# Patient Record
Sex: FEMALE | Race: BLACK OR AFRICAN AMERICAN | NOT HISPANIC OR LATINO | Employment: FULL TIME | URBAN - METROPOLITAN AREA
[De-identification: names, ages, dates, MRNs, and addresses within clinical notes are randomized per-mention and may not be internally consistent; named-entity substitution may affect disease eponyms.]

---

## 2017-10-10 ENCOUNTER — HOSPITAL ENCOUNTER (OUTPATIENT)
Dept: OTHER | Facility: HOSPITAL | Age: 47
Setting detail: SPECIMEN
Discharge: HOME | End: 2017-10-10
Attending: INTERNAL MEDICINE | Admitting: INTERNAL MEDICINE

## 2018-08-14 ENCOUNTER — OFFICE VISIT (OUTPATIENT)
Dept: FAMILY MEDICINE CLINIC | Facility: CLINIC | Age: 48
End: 2018-08-14

## 2018-08-14 VITALS
SYSTOLIC BLOOD PRESSURE: 110 MMHG | OXYGEN SATURATION: 98 % | TEMPERATURE: 98.3 F | BODY MASS INDEX: 31.47 KG/M2 | HEIGHT: 62 IN | WEIGHT: 171 LBS | DIASTOLIC BLOOD PRESSURE: 64 MMHG | HEART RATE: 73 BPM

## 2018-08-14 DIAGNOSIS — R10.13 EPIGASTRIC ABDOMINAL PAIN: ICD-10-CM

## 2018-08-14 DIAGNOSIS — R53.83 OTHER FATIGUE: ICD-10-CM

## 2018-08-14 DIAGNOSIS — D64.9 ANEMIA, UNSPECIFIED TYPE: ICD-10-CM

## 2018-08-14 DIAGNOSIS — R25.2 MUSCLE CRAMPS: ICD-10-CM

## 2018-08-14 DIAGNOSIS — K90.0 CELIAC DISEASE: Primary | ICD-10-CM

## 2018-08-14 DIAGNOSIS — L98.9 SKIN LESION OF FACE: ICD-10-CM

## 2018-08-14 PROBLEM — R00.0 TACHYCARDIA: Status: ACTIVE | Noted: 2018-08-14

## 2018-08-14 PROCEDURE — 99203 OFFICE O/P NEW LOW 30 MIN: CPT | Performed by: FAMILY MEDICINE

## 2018-08-14 RX ORDER — SERTRALINE HYDROCHLORIDE 100 MG/1
TABLET, FILM COATED ORAL
COMMUNITY
Start: 2017-10-23 | End: 2018-11-12 | Stop reason: SDUPTHER

## 2018-08-14 RX ORDER — METOPROLOL SUCCINATE 50 MG/1
TABLET, EXTENDED RELEASE ORAL
COMMUNITY
Start: 2018-02-06 | End: 2019-01-24

## 2018-08-14 RX ORDER — OMEPRAZOLE 20 MG/1
CAPSULE, DELAYED RELEASE ORAL
COMMUNITY
Start: 2018-02-28 | End: 2019-11-18

## 2018-08-14 RX ORDER — DICYCLOMINE HCL 20 MG
TABLET ORAL
COMMUNITY
Start: 2018-08-13 | End: 2019-04-16

## 2018-08-14 RX ORDER — FOLIC ACID 1 MG/1
1000 TABLET ORAL DAILY
Refills: 5 | COMMUNITY
Start: 2018-07-23 | End: 2022-01-01

## 2018-08-14 NOTE — PROGRESS NOTES
Subjective   Juanita Burden is a 47 y.o. female. Presents today for   Chief Complaint   Patient presents with   • Establish Care     Pt here to establish care. She has painful rash on right side of her face that she would like checked out. Also, the muscle in her right thigh has been weak for about one month. Also, her hands have been shaking when trying to lift something. Also, her feet keep cramping up.     46 y/o new patient here with hx of celiac disease.  Having repeat endoscopy this Friday;  Having worsening anemia.  Follows with Dr. Salas;  Hx of tachycardia, reports normal stress, ultimately placed on metoprolol, still follows with Cardiology.  Follows with gynecology and placed on HRT and no periods for years, so not cause of anemia;  Had iron infusion July 26 and August 2.  Follows with Dr. Hawk, hematology.  Reports diarrhea better since gluten free diet.    Rash   This is a new problem. The current episode started more than 1 month ago. The problem has been waxing and waning since onset. Location: right cheek and forehead. The rash is characterized by burning and itchiness. She was exposed to nothing. Associated symptoms include diarrhea and fatigue. Pertinent negatives include no shortness of breath. Past treatments include moisturizer, topical steroids, anti-itch cream and antibiotic cream. The treatment provided no relief.   Lower Extremity Issue   This is a new problem. Episode onset: 1 month. The problem occurs constantly. The problem has been unchanged. Associated symptoms include arthralgias, fatigue, myalgias, a rash and weakness. Nothing (denies injuries) aggravates the symptoms. She has tried nothing for the symptoms. The treatment provided no relief.   Muscle Pain   This is a new problem. The current episode started more than 1 month ago. The problem occurs intermittently. Pain location: feet cramp and curl;  The pain is medium. Associated symptoms include constipation, diarrhea, fatigue, a rash  "and weakness. Pertinent negatives include no shortness of breath. The treatment provided moderate relief. She has been behaving normally. Her past medical history is significant for chronic back pain.     Would like anemia rechecked.    Review of Systems   Constitutional: Positive for fatigue.   Respiratory: Negative for shortness of breath.    Gastrointestinal: Positive for constipation and diarrhea.   Musculoskeletal: Positive for arthralgias and myalgias.   Skin: Positive for rash.   Neurological: Positive for weakness.       Patient Active Problem List   Diagnosis   • History of MRSA infection   • Celiac disease   • Tachycardia   • Anemia       Social History     Social History   • Marital status:      Social History Main Topics   • Smoking status: Never Smoker   • Smokeless tobacco: Never Used   • Alcohol use No      Comment: rare   • Drug use: No     Other Topics Concern   • Not on file       No Known Allergies    No current outpatient prescriptions on file prior to visit.     No current facility-administered medications on file prior to visit.        Objective   Vitals:    08/14/18 0924   BP: 110/64   BP Location: Left arm   Patient Position: Sitting   Cuff Size: Large Adult   Pulse: 73   Temp: 98.3 °F (36.8 °C)   TempSrc: Oral   SpO2: 98%   Weight: 77.6 kg (171 lb)   Height: 157.5 cm (62\")       Physical Exam   Constitutional: She appears well-developed and well-nourished.   HENT:   Head: Normocephalic and atraumatic.   Neck: Neck supple. No JVD present. No thyromegaly present.   Cardiovascular: Normal rate, regular rhythm and normal heart sounds.  Exam reveals no gallop and no friction rub.    No murmur heard.  Pulmonary/Chest: Effort normal and breath sounds normal. No respiratory distress. She has no wheezes. She has no rales.   Abdominal: Soft. Bowel sounds are normal. She exhibits no distension. There is tenderness in the epigastric area. There is no rebound and no guarding.   Musculoskeletal: " She exhibits no edema.   Neurological: She is alert.   Skin: Skin is warm and dry.   Right cheek, red, scaly lesion;  Forehead papule   Psychiatric: She has a normal mood and affect. Her behavior is normal.   Nursing note and vitals reviewed.      Assessment/Plan   Juanita was seen today for establish care.    Diagnoses and all orders for this visit:    Celiac disease  -     Comprehensive Metabolic Panel  -     TSH  -     T4, Free  -     T3, Free  -     Vitamin B12  -     Magnesium  -     Ferritin  -     CBC & Differential  -     Folate  -     CK    Anemia, unspecified type  -     Comprehensive Metabolic Panel  -     TSH  -     T4, Free  -     T3, Free  -     Vitamin B12  -     Magnesium  -     Ferritin  -     CBC & Differential  -     Folate  -     CK    Skin lesion of face  -     Ambulatory Referral to Dermatology  -     hydrocortisone 2.5 % cream; Apply  topically to the appropriate area as directed 2 (Two) Times a Day. Avoid eyes    Other fatigue  -     Comprehensive Metabolic Panel  -     TSH  -     T4, Free  -     T3, Free  -     Vitamin B12  -     Magnesium  -     Ferritin  -     CBC & Differential  -     Folate  -     CK    Muscle cramps  -     Comprehensive Metabolic Panel  -     TSH  -     T4, Free  -     T3, Free  -     Vitamin B12  -     Magnesium  -     Ferritin  -     CBC & Differential  -     Folate  -     CK    Epigastric abdominal pain    Forward to Dr. Salas and Dr. Hawk labs  Will check labs as above  Proceed with endoscopy  Continue gluten free diet as directed  D/w prilosec can reduce absorption of b12 and mag         -Follow up: 6 months and prn

## 2018-08-15 LAB
ALBUMIN SERPL-MCNC: 4.1 G/DL (ref 3.5–5.2)
ALBUMIN/GLOB SERPL: 1.4 G/DL
ALP SERPL-CCNC: 49 U/L (ref 39–117)
ALT SERPL-CCNC: 21 U/L (ref 1–33)
AST SERPL-CCNC: 16 U/L (ref 1–32)
BASOPHILS # BLD AUTO: 0.04 10*3/MM3 (ref 0–0.2)
BASOPHILS NFR BLD AUTO: 0.5 % (ref 0–1.5)
BILIRUB SERPL-MCNC: <0.2 MG/DL (ref 0.1–1.2)
BUN SERPL-MCNC: 16 MG/DL (ref 6–20)
BUN/CREAT SERPL: 19.8 (ref 7–25)
CALCIUM SERPL-MCNC: 9.8 MG/DL (ref 8.6–10.5)
CHLORIDE SERPL-SCNC: 104 MMOL/L (ref 98–107)
CK SERPL-CCNC: 19 U/L (ref 20–180)
CO2 SERPL-SCNC: 24 MMOL/L (ref 22–29)
CREAT SERPL-MCNC: 0.81 MG/DL (ref 0.57–1)
EOSINOPHIL # BLD AUTO: 0.13 10*3/MM3 (ref 0–0.7)
EOSINOPHIL NFR BLD AUTO: 1.6 % (ref 0.3–6.2)
ERYTHROCYTE [DISTWIDTH] IN BLOOD BY AUTOMATED COUNT: 27 % (ref 11.7–13)
FERRITIN SERPL-MCNC: 298.4 NG/ML (ref 13–150)
FOLATE SERPL-MCNC: >20 NG/ML (ref 4.78–24.2)
GLOBULIN SER CALC-MCNC: 3 GM/DL
GLUCOSE SERPL-MCNC: 91 MG/DL (ref 65–99)
HCT VFR BLD AUTO: 36.9 % (ref 35.6–45.5)
HGB BLD-MCNC: 10.8 G/DL (ref 11.9–15.5)
IMM GRANULOCYTES # BLD: 0.02 10*3/MM3 (ref 0–0.03)
IMM GRANULOCYTES NFR BLD: 0.2 % (ref 0–0.5)
LYMPHOCYTES # BLD AUTO: 1.5 10*3/MM3 (ref 0.9–4.8)
LYMPHOCYTES NFR BLD AUTO: 18.6 % (ref 19.6–45.3)
MAGNESIUM SERPL-MCNC: 2.4 MG/DL (ref 1.6–2.6)
MCH RBC QN AUTO: 23.2 PG (ref 26.9–32)
MCHC RBC AUTO-ENTMCNC: 29.3 G/DL (ref 32.4–36.3)
MCV RBC AUTO: 79.2 FL (ref 80.5–98.2)
MONOCYTES # BLD AUTO: 0.71 10*3/MM3 (ref 0.2–1.2)
MONOCYTES NFR BLD AUTO: 8.8 % (ref 5–12)
NEUTROPHILS # BLD AUTO: 5.7 10*3/MM3 (ref 1.9–8.1)
NEUTROPHILS NFR BLD AUTO: 70.5 % (ref 42.7–76)
PLATELET # BLD AUTO: 491 10*3/MM3 (ref 140–500)
POTASSIUM SERPL-SCNC: 5.4 MMOL/L (ref 3.5–5.2)
PROT SERPL-MCNC: 7.1 G/DL (ref 6–8.5)
RBC # BLD AUTO: 4.66 10*6/MM3 (ref 3.9–5.2)
SODIUM SERPL-SCNC: 142 MMOL/L (ref 136–145)
T3FREE SERPL-MCNC: 2.5 PG/ML (ref 2–4.4)
T4 FREE SERPL-MCNC: 0.95 NG/DL (ref 0.93–1.7)
TSH SERPL DL<=0.005 MIU/L-ACNC: 4.07 MIU/ML (ref 0.27–4.2)
VIT B12 SERPL-MCNC: 598 PG/ML (ref 211–946)
WBC # BLD AUTO: 8.08 10*3/MM3 (ref 4.5–10.7)

## 2018-08-15 NOTE — PROGRESS NOTES
Call and mail copy of results to patient.  Forward to Dr. Salas (GI) and Dr. Carine chow (hematology).  Potassium borderline elevated, but likely just hemolyzed  Electrolytes are normal  Thyroid, kidney and liver function normal.  Her thyroid is ok, but in borderline normal range.  I would suggest recheck TSH in 3 months dx fatigue to make sure doesn't change.  Blood counts stable as compared to outside labs;  b12 and folate ok.

## 2018-08-29 DIAGNOSIS — R53.83 FATIGUE, UNSPECIFIED TYPE: Primary | ICD-10-CM

## 2018-11-12 RX ORDER — SERTRALINE HYDROCHLORIDE 100 MG/1
100 TABLET, FILM COATED ORAL
Qty: 90 TABLET | Refills: 0 | Status: SHIPPED | OUTPATIENT
Start: 2018-11-12 | End: 2018-12-03

## 2018-11-15 ENCOUNTER — OFFICE VISIT (OUTPATIENT)
Dept: FAMILY MEDICINE CLINIC | Facility: CLINIC | Age: 48
End: 2018-11-15

## 2018-11-15 VITALS
SYSTOLIC BLOOD PRESSURE: 128 MMHG | OXYGEN SATURATION: 98 % | TEMPERATURE: 98.5 F | DIASTOLIC BLOOD PRESSURE: 70 MMHG | HEART RATE: 74 BPM | HEIGHT: 62 IN | BODY MASS INDEX: 30 KG/M2 | WEIGHT: 163 LBS

## 2018-11-15 DIAGNOSIS — K59.03 DRUG INDUCED CONSTIPATION: ICD-10-CM

## 2018-11-15 DIAGNOSIS — L20.89 FLEXURAL ATOPIC DERMATITIS: ICD-10-CM

## 2018-11-15 DIAGNOSIS — C49.A3 GIST (GASTROINTESTINAL STROMAL TUMOR) OF SMALL BOWEL, MALIGNANT (HCC): ICD-10-CM

## 2018-11-15 DIAGNOSIS — R11.0 NAUSEA: ICD-10-CM

## 2018-11-15 DIAGNOSIS — R73.9 HYPERGLYCEMIA: Primary | ICD-10-CM

## 2018-11-15 DIAGNOSIS — M79.10 MYALGIA: ICD-10-CM

## 2018-11-15 PROBLEM — K56.609 BOWEL OBSTRUCTION (HCC): Status: ACTIVE | Noted: 2018-08-14

## 2018-11-15 PROCEDURE — 99213 OFFICE O/P EST LOW 20 MIN: CPT | Performed by: FAMILY MEDICINE

## 2018-11-15 RX ORDER — IMATINIB MESYLATE 400 MG/1
TABLET, FILM COATED ORAL
COMMUNITY
Start: 2018-11-08 | End: 2021-07-12

## 2018-11-15 RX ORDER — ONDANSETRON 4 MG/1
4 TABLET, ORALLY DISINTEGRATING ORAL EVERY 6 HOURS PRN
Qty: 90 TABLET | Refills: 5 | Status: SHIPPED | OUTPATIENT
Start: 2018-11-15 | End: 2019-04-16

## 2018-11-15 RX ORDER — FLUOROURACIL 50 MG/G
CREAM TOPICAL
Refills: 0 | COMMUNITY
Start: 2018-09-06 | End: 2018-11-15

## 2018-11-15 RX ORDER — OXYCODONE AND ACETAMINOPHEN 10; 325 MG/1; MG/1
TABLET ORAL
COMMUNITY
Start: 2018-11-13 | End: 2019-01-24

## 2018-11-15 RX ORDER — TRIAMCINOLONE ACETONIDE 1 MG/G
CREAM TOPICAL 2 TIMES DAILY PRN
Qty: 60 G | Refills: 2 | Status: SHIPPED | OUTPATIENT
Start: 2018-11-15 | End: 2019-01-24

## 2018-11-15 RX ORDER — LUBIPROSTONE 24 UG/1
24 CAPSULE ORAL 2 TIMES DAILY WITH MEALS
Start: 2018-11-15 | End: 2018-11-27 | Stop reason: SDUPTHER

## 2018-11-15 NOTE — PROGRESS NOTES
Subjective   Juanita Burden is a 47 y.o. female. Presents today for   Chief Complaint   Patient presents with   • Follow-up     pt here for hospital follow up   • Hyperglycemia     pt is concerned about high glucose readings   • Hand Pain     pt's hands have been bothering her since getting a lot of blood draws in the hand. the pain has been radiating from the finger tips and her fingers are peeling. she has become a very hard stick and wonders if something going on with her veins.   • Foot Pain     pt's foot has been hurting on the bottom and it is now on the bottom of her toes.       Rash   This is a new problem. The current episode started 1 to 4 weeks ago. The affected locations include the left hand and right hand. The rash is characterized by peeling, scaling and itchiness. Past treatments include nothing. The treatment provided no relief.   Lower Extremity Issue   This is a recurrent problem. The current episode started 1 to 4 weeks ago. The problem occurs intermittently. The problem has been waxing and waning. Associated symptoms include myalgias and a rash. Pertinent negatives include no numbness. The symptoms are aggravated by walking. She has tried nothing for the symptoms. The treatment provided no relief.     Patient found to have partial SBO 2ndary to GIST stromal tumor;  Does also have hx of celiac disease.  Since on pain medications having more constipation;  No blood in stool.    She noticed blood sugar on labs elevated and thought very high, we discussed RBS >200 represent diabetes, but warrants a1c to screen for dm2.  Review of Systems   Musculoskeletal: Positive for myalgias.   Skin: Positive for rash.   Neurological: Negative for numbness.       Patient Active Problem List   Diagnosis   • History of MRSA infection   • Bowel obstruction (CMS/HCC)   • Tachycardia   • Anemia       Social History     Socioeconomic History   • Marital status:      Spouse name: Not on file   • Number of children:  "Not on file   • Years of education: Not on file   • Highest education level: Not on file   Tobacco Use   • Smoking status: Never Smoker   • Smokeless tobacco: Never Used   Substance and Sexual Activity   • Alcohol use: No     Comment: rare   • Drug use: No       No Known Allergies    Current Outpatient Medications on File Prior to Visit   Medication Sig Dispense Refill   • dicyclomine (BENTYL) 20 MG tablet      • folic acid (FOLVITE) 1 MG tablet Take 1,000 mcg by mouth Daily.  5   • hydrocortisone 2.5 % cream Apply  topically to the appropriate area as directed 2 (Two) Times a Day. Avoid eyes 30 g 0   • imatinib (GLEEVEC) 400 MG chemo tablet      • metoprolol succinate XL (TOPROL-XL) 50 MG 24 hr tablet TAKE 1 TABLET BY MOUTH EVERY NIGHT AT BEDTIME     • norethindrone (AYGESTIN) 5 MG tablet Take 5 mg by mouth Daily.  11   • omeprazole (priLOSEC) 20 MG capsule TAKE ONE CAP BY MOUTH DAILY     • oxyCODONE-acetaminophen (PERCOCET)  MG per tablet      • sertraline (ZOLOFT) 100 MG tablet Take 1 tablet by mouth every night at bedtime. 90 tablet 0   • [DISCONTINUED] fluorouracil (EFUDEX) 5 % cream APPLY TO LESIONS ON FACE DAILY FOR 14 DAYS  0     No current facility-administered medications on file prior to visit.        Objective   Vitals:    11/15/18 1615   BP: 128/70   BP Location: Right arm   Patient Position: Sitting   Cuff Size: Adult   Pulse: 74   Temp: 98.5 °F (36.9 °C)   TempSrc: Oral   SpO2: 98%   Weight: 73.9 kg (163 lb)   Height: 157.5 cm (62.01\")       Physical Exam   Constitutional: She appears well-developed and well-nourished.   HENT:   Head: Normocephalic and atraumatic.   Neck: Neck supple. No JVD present. No thyromegaly present.   Cardiovascular: Normal rate, regular rhythm and normal heart sounds. Exam reveals no gallop and no friction rub.   No murmur heard.  Pulmonary/Chest: Effort normal and breath sounds normal. No respiratory distress. She has no wheezes. She has no rales.   Abdominal: Soft. " Bowel sounds are normal. She exhibits no distension. There is no tenderness. There is no rebound and no guarding.   Musculoskeletal: She exhibits no edema.        Right foot: There is tenderness.        Left foot: There is tenderness.   Neurological: She is alert.   Skin: Skin is warm and dry.   Peeling skin over hands.     Psychiatric: She has a normal mood and affect. Her behavior is normal.   Nursing note and vitals reviewed.      Assessment/Plan   Juanita was seen today for follow-up, hyperglycemia, hand pain and foot pain.    Diagnoses and all orders for this visit:    Hyperglycemia  -     Hemoglobin A1c    Flexural atopic dermatitis  -     triamcinolone (KENALOG) 0.1 % cream; Apply  topically to the appropriate area as directed 2 (Two) Times a Day As Needed for Irritation or Rash (to hands).    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    Myalgia    Drug induced constipation  -     lubiprostone (AMITIZA) 24 MCG capsule; Take 1 capsule by mouth 2 (Two) Times a Day With Meals.        -gave #40 samples of amitiza to use for constipation with percocet.  Try changing padding on shoes more regularly  Topical lotion for hands and will give steroid  Gave amitiza samples to try, call if relieves and will send Rx.       -Follow up: 3 months and prn

## 2018-11-16 LAB — HBA1C MFR BLD: 6 % (ref 4.8–5.6)

## 2018-11-18 NOTE — PROGRESS NOTES
Call results to patient.  a1c is 6% which is in prediabetes range.  We will monitor, for now I would focus on eating what can since she has cancer and about to start chemo.

## 2018-11-27 ENCOUNTER — TELEPHONE (OUTPATIENT)
Dept: FAMILY MEDICINE CLINIC | Facility: CLINIC | Age: 48
End: 2018-11-27

## 2018-11-27 DIAGNOSIS — K59.03 DRUG INDUCED CONSTIPATION: ICD-10-CM

## 2018-11-27 RX ORDER — LUBIPROSTONE 24 UG/1
24 CAPSULE ORAL 2 TIMES DAILY WITH MEALS
Qty: 180 CAPSULE | Refills: 1 | Status: SHIPPED | OUTPATIENT
Start: 2018-11-27 | End: 2019-01-24

## 2018-11-30 ENCOUNTER — TELEPHONE (OUTPATIENT)
Dept: FAMILY MEDICINE CLINIC | Facility: CLINIC | Age: 48
End: 2018-11-30

## 2018-12-03 RX ORDER — LAMOTRIGINE 25 MG/1
TABLET ORAL
Qty: 60 TABLET | Refills: 0 | Status: SHIPPED | OUTPATIENT
Start: 2018-12-03 | End: 2018-12-10 | Stop reason: SINTOL

## 2018-12-03 RX ORDER — SERTRALINE HYDROCHLORIDE 100 MG/1
150 TABLET, FILM COATED ORAL
Qty: 90 TABLET | Refills: 0
Start: 2018-12-03 | End: 2019-03-06 | Stop reason: SDUPTHER

## 2018-12-03 NOTE — TELEPHONE ENCOUNTER
She has cancer and I imagine very hard right now.  INcrease sertraline to 100mg 1.5 tabs daily.  Also, add lamictal 25mg 1 po nightly x 3 nights, then 1 po bid.  This may help with irritability.  She should let me know if any thoughts of self harm or not doing well right away.    RRJ

## 2018-12-10 ENCOUNTER — TELEPHONE (OUTPATIENT)
Dept: FAMILY MEDICINE CLINIC | Facility: CLINIC | Age: 48
End: 2018-12-10

## 2018-12-10 RX ORDER — BUSPIRONE HYDROCHLORIDE 7.5 MG/1
7.5 TABLET ORAL 3 TIMES DAILY
Qty: 60 TABLET | Refills: 1 | Status: SHIPPED | OUTPATIENT
Start: 2018-12-10 | End: 2019-01-23 | Stop reason: SDUPTHER

## 2018-12-31 RX ORDER — LAMOTRIGINE 25 MG/1
TABLET ORAL
Qty: 60 TABLET | Refills: 0 | OUTPATIENT
Start: 2018-12-31

## 2019-01-24 ENCOUNTER — OFFICE VISIT (OUTPATIENT)
Dept: FAMILY MEDICINE CLINIC | Facility: CLINIC | Age: 49
End: 2019-01-24

## 2019-01-24 VITALS
HEART RATE: 85 BPM | SYSTOLIC BLOOD PRESSURE: 118 MMHG | WEIGHT: 164 LBS | HEIGHT: 62 IN | DIASTOLIC BLOOD PRESSURE: 74 MMHG | OXYGEN SATURATION: 97 % | BODY MASS INDEX: 30.18 KG/M2 | TEMPERATURE: 99.3 F

## 2019-01-24 DIAGNOSIS — C49.A3 GIST (GASTROINTESTINAL STROMAL TUMOR) OF SMALL BOWEL, MALIGNANT (HCC): Primary | ICD-10-CM

## 2019-01-24 DIAGNOSIS — Z00.00 HEALTHCARE MAINTENANCE: ICD-10-CM

## 2019-01-24 DIAGNOSIS — Z86.14 HISTORY OF MRSA INFECTION: ICD-10-CM

## 2019-01-24 PROCEDURE — 99396 PREV VISIT EST AGE 40-64: CPT | Performed by: NURSE PRACTITIONER

## 2019-01-24 RX ORDER — BUSPIRONE HYDROCHLORIDE 7.5 MG/1
TABLET ORAL
Qty: 60 TABLET | Refills: 1 | Status: SHIPPED | OUTPATIENT
Start: 2019-01-24 | End: 2019-02-14 | Stop reason: SDUPTHER

## 2019-01-24 NOTE — PROGRESS NOTES
"Subjective   Juanita Burden is a 48 y.o. female who presents for annual  physical.     History of Present Illness    History of GIST, prognosis is good with Gleevec, had recent CT, no results yet.   Also history of MRSA infection, last 3 yrs ago  Anxiety is improving, controlled with sertraline increase and buspirone. Noted increase with diagnosis initially. Reports good control and good support system. Trying to adopt. Long term foster care of child.   The following portions of the patient's history were reviewed and updated as appropriate: allergies, current medications, past family history, past medical history, past social history, past surgical history and problem list.    Review of Systems   Constitutional: Negative for activity change, appetite change, fatigue, unexpected weight gain and unexpected weight loss.   Respiratory: Negative.  Negative for shortness of breath.    Cardiovascular: Negative.  Negative for chest pain, palpitations and leg swelling.   Gastrointestinal: Positive for abdominal pain.   Psychiatric/Behavioral: Positive for stress. Negative for sleep disturbance, suicidal ideas and depressed mood. The patient is nervous/anxious.      /74   Pulse 85   Temp 99.3 °F (37.4 °C) (Oral)   Ht 157.5 cm (62\")   Wt 74.4 kg (164 lb)   SpO2 97%   BMI 30.00 kg/m²     Objective   Physical Exam   Constitutional: She appears well-developed and well-nourished.   Neck: Normal range of motion. Neck supple. No thyromegaly present.   Cardiovascular: Normal rate, regular rhythm and normal heart sounds.   Pulmonary/Chest: Effort normal and breath sounds normal.   Lymphadenopathy:     She has no cervical adenopathy.   Skin: Skin is warm and dry.   Psychiatric: She has a normal mood and affect. Her behavior is normal. Judgment and thought content normal.   Nursing note and vitals reviewed.    Assessment/Plan   Problems Addressed this Visit        Digestive    GIST (gastrointestinal stromal " tumor) of small bowel, malignant (CMS/HCC) - Primary       Other    History of MRSA infection      Other Visit Diagnoses     Healthcare maintenance            No contraindications to foster care. Recommend update flu vaccine, but declines.

## 2019-02-14 ENCOUNTER — OFFICE VISIT (OUTPATIENT)
Dept: FAMILY MEDICINE CLINIC | Facility: CLINIC | Age: 49
End: 2019-02-14

## 2019-02-14 VITALS
DIASTOLIC BLOOD PRESSURE: 60 MMHG | TEMPERATURE: 98.6 F | HEART RATE: 81 BPM | WEIGHT: 167 LBS | SYSTOLIC BLOOD PRESSURE: 102 MMHG | BODY MASS INDEX: 30.54 KG/M2 | OXYGEN SATURATION: 97 %

## 2019-02-14 DIAGNOSIS — F41.9 ANXIETY: ICD-10-CM

## 2019-02-14 DIAGNOSIS — L30.8 OTHER ECZEMA: Primary | ICD-10-CM

## 2019-02-14 PROCEDURE — 99213 OFFICE O/P EST LOW 20 MIN: CPT | Performed by: FAMILY MEDICINE

## 2019-02-14 RX ORDER — TRIAMCINOLONE ACETONIDE 5 MG/G
OINTMENT TOPICAL 2 TIMES DAILY
Qty: 60 G | Refills: 2 | Status: SHIPPED | OUTPATIENT
Start: 2019-02-14 | End: 2019-04-16

## 2019-02-14 RX ORDER — BUSPIRONE HYDROCHLORIDE 7.5 MG/1
7.5 TABLET ORAL 3 TIMES DAILY
Qty: 90 TABLET | Refills: 3 | Status: SHIPPED | OUTPATIENT
Start: 2019-02-14 | End: 2019-08-15 | Stop reason: SDUPTHER

## 2019-02-14 NOTE — PROGRESS NOTES
Subjective   Juanita Burden is a 48 y.o. female. Presents today for   Chief Complaint   Patient presents with   • Follow-up     new meds and also has a rash on her back and very itchy.       Rash   This is a new problem. Episode onset: 2 weeks  Location: back. She was exposed to a new detergent/soap (went from free & clear to regular). (Hx of GIST tumor;  ) Past treatments include moisturizer, anti-itch cream and topical steroids. The treatment provided no relief.   anxiety    Rough middle of day for anxiety, would like add tid to buspar as helping    Review of Systems   Skin: Positive for rash.   Psychiatric/Behavioral: Positive for sleep disturbance. The patient is nervous/anxious.        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, malignant (CMS/HCC)   • Tachycardia   • Anemia       Social History     Socioeconomic History   • Marital status:      Spouse name: Not on file   • Number of children: Not on file   • Years of education: Not on file   • Highest education level: Not on file   Tobacco Use   • Smoking status: Never Smoker   • Smokeless tobacco: Never Used   Substance and Sexual Activity   • Alcohol use: No     Comment: rare   • Drug use: No       No Known Allergies    Current Outpatient Medications on File Prior to Visit   Medication Sig Dispense Refill   • busPIRone (BUSPAR) 7.5 MG tablet TAKE 1 TABLET BY MOUTH THREE TIMES A DAY 60 tablet 1   • dicyclomine (BENTYL) 20 MG tablet      • folic acid (FOLVITE) 1 MG tablet Take 1,000 mcg by mouth Daily.  5   • imatinib (GLEEVEC) 400 MG chemo tablet      • norethindrone (AYGESTIN) 5 MG tablet Take 5 mg by mouth Daily.  11   • omeprazole (priLOSEC) 20 MG capsule TAKE ONE CAP BY MOUTH DAILY     • ondansetron ODT (ZOFRAN-ODT) 4 MG disintegrating tablet Take 1 tablet by mouth Every 6 (Six) Hours As Needed for Nausea or Vomiting. 90 tablet 5   • sertraline (ZOLOFT) 100 MG tablet  Take 1.5 tablets by mouth every night at bedtime. 90 tablet 0     No current facility-administered medications on file prior to visit.        Objective   Vitals:    02/14/19 0902   BP: 102/60   Pulse: 81   Temp: 98.6 °F (37 °C)   SpO2: 97%   Weight: 75.8 kg (167 lb)       Physical Exam   Constitutional: She is oriented to person, place, and time. She appears well-developed and well-nourished.   Neurological: She is alert and oriented to person, place, and time.   Skin: Skin is warm and dry.   Papular rash, excoriated over back.   Psychiatric: She has a normal mood and affect. Her behavior is normal.   Nursing note and vitals reviewed.      Assessment/Plan   uJanita was seen today for follow-up.    Diagnoses and all orders for this visit:    Other eczema  -     triamcinolone (KENALOG) 0.5 % ointment; Apply  topically to the appropriate area as directed 2 (Two) Times a Day. To back    Anxiety  -     busPIRone (BUSPAR) 7.5 MG tablet; Take 1 tablet by mouth 3 (Three) Times a Day.    trial topical steroid  Ok change buspar to tid         -Follow up: 6 months

## 2019-03-06 RX ORDER — SERTRALINE HYDROCHLORIDE 100 MG/1
TABLET, FILM COATED ORAL
Qty: 90 TABLET | Refills: 0 | OUTPATIENT
Start: 2019-03-06

## 2019-03-06 RX ORDER — SERTRALINE HYDROCHLORIDE 100 MG/1
150 TABLET, FILM COATED ORAL
Qty: 135 TABLET | Refills: 1 | Status: SHIPPED | OUTPATIENT
Start: 2019-03-06 | End: 2019-07-26 | Stop reason: SDUPTHER

## 2019-04-16 ENCOUNTER — OFFICE VISIT (OUTPATIENT)
Dept: FAMILY MEDICINE CLINIC | Facility: CLINIC | Age: 49
End: 2019-04-16

## 2019-04-16 VITALS
BODY MASS INDEX: 31.65 KG/M2 | OXYGEN SATURATION: 99 % | SYSTOLIC BLOOD PRESSURE: 118 MMHG | WEIGHT: 172 LBS | HEART RATE: 101 BPM | TEMPERATURE: 98.9 F | DIASTOLIC BLOOD PRESSURE: 62 MMHG | HEIGHT: 62 IN

## 2019-04-16 DIAGNOSIS — J01.00 ACUTE NON-RECURRENT MAXILLARY SINUSITIS: Primary | ICD-10-CM

## 2019-04-16 PROCEDURE — 99213 OFFICE O/P EST LOW 20 MIN: CPT | Performed by: NURSE PRACTITIONER

## 2019-04-16 RX ORDER — METOPROLOL SUCCINATE 50 MG/1
50 TABLET, EXTENDED RELEASE ORAL DAILY
COMMUNITY
End: 2020-07-22

## 2019-04-16 RX ORDER — AMOXICILLIN 500 MG/1
1000 CAPSULE ORAL 2 TIMES DAILY
Qty: 28 CAPSULE | Refills: 0 | Status: SHIPPED | OUTPATIENT
Start: 2019-04-16 | End: 2019-05-24

## 2019-04-16 RX ORDER — MULTIVITAMIN WITH IRON
TABLET ORAL
COMMUNITY
End: 2019-11-18

## 2019-04-16 NOTE — PROGRESS NOTES
"Subjective   Juanita Burden is a 48 y.o. female who presents c/o cough, congestion, SOA.     History of Present Illness   Onset with sinus issues x 2 weeks, gradually worsening with cough and congestion, SOA in the last 2 days. Worse with laying down. Has elevated HOB. Taking sinus medication, flonase and zyrtec, but symptoms are worsening. On last labs WBC was down from the chemo, typically able to fight infections with otc medications.   The following portions of the patient's history were reviewed and updated as appropriate: allergies, current medications, past family history, past medical history, past social history, past surgical history and problem list.    Review of Systems   Constitutional: Negative for chills and fever.   HENT: Positive for congestion, ear pain, postnasal drip, rhinorrhea, sinus pressure, sore throat and voice change.    Respiratory: Positive for cough (productive green brown, thick) and shortness of breath. Negative for wheezing.    Cardiovascular: Negative.    Musculoskeletal: Negative.    Skin: Negative.    Allergic/Immunologic: Positive for environmental allergies.   Neurological: Positive for headache.   Psychiatric/Behavioral: Negative.      /62   Pulse 101   Temp 98.9 °F (37.2 °C) (Oral)   Ht 157.5 cm (62\")   Wt 78 kg (172 lb)   SpO2 99%   BMI 31.46 kg/m²     Objective   Physical Exam   Constitutional: She appears well-developed and well-nourished.   HENT:   Right Ear: Tympanic membrane normal.   Left Ear: Tympanic membrane normal.   Nose: Mucosal edema and rhinorrhea present. Right sinus exhibits maxillary sinus tenderness. Left sinus exhibits maxillary sinus tenderness.   Mouth/Throat: Uvula is midline and mucous membranes are normal. Posterior oropharyngeal erythema present.   Neck: Normal range of motion. Neck supple. No tracheal deviation present. No thyromegaly present.   Cardiovascular: Normal rate, regular rhythm and normal heart sounds.   Pulmonary/Chest: Effort " normal and breath sounds normal. No stridor. No respiratory distress.   Lymphadenopathy:     She has no cervical adenopathy.   Skin: Skin is warm and dry.   Psychiatric: She has a normal mood and affect. Her behavior is normal. Judgment and thought content normal.   Nursing note and vitals reviewed.    Assessment/Plan   Problems Addressed this Visit     None      Visit Diagnoses     Acute non-recurrent maxillary sinusitis    -  Primary    Relevant Medications    amoxicillin (AMOXIL) 500 MG capsule        Sinusitis--start amoxil. Continue symptom controllers and follow up if not settling 1 week, sooner if any worsening.

## 2019-05-24 ENCOUNTER — OFFICE VISIT (OUTPATIENT)
Dept: FAMILY MEDICINE CLINIC | Facility: CLINIC | Age: 49
End: 2019-05-24

## 2019-05-24 VITALS
OXYGEN SATURATION: 99 % | HEART RATE: 87 BPM | BODY MASS INDEX: 33.49 KG/M2 | WEIGHT: 182 LBS | HEIGHT: 62 IN | SYSTOLIC BLOOD PRESSURE: 100 MMHG | DIASTOLIC BLOOD PRESSURE: 60 MMHG

## 2019-05-24 DIAGNOSIS — M67.431 GANGLION CYST OF WRIST, RIGHT: Primary | ICD-10-CM

## 2019-05-24 PROCEDURE — 99213 OFFICE O/P EST LOW 20 MIN: CPT | Performed by: FAMILY MEDICINE

## 2019-05-24 RX ORDER — DOXYCYCLINE 50 MG/1
50 CAPSULE ORAL DAILY
Refills: 1 | COMMUNITY
Start: 2019-05-15 | End: 2020-03-16

## 2019-05-24 NOTE — PROGRESS NOTES
Subjective   Juanita Burden is a 48 y.o. female. Presents today for   Chief Complaint   Patient presents with   • Wrist Pain     right       Wrist Pain    Pain location: right wrist. This is a new problem. Episode onset: 6 to 8 months;  has had prior ganglion cysts removed.  Starting to hurt. The problem occurs constantly. The problem has been unchanged. The pain is moderate. Pertinent negatives include no joint locking, joint swelling, limited range of motion, numbness or tingling. She has tried nothing for the symptoms. The treatment provided no relief.       Review of Systems   Neurological: Negative for tingling and numbness.       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, malignant (CMS/HCC)   • Tachycardia   • Anemia   • Anxiety       Social History     Socioeconomic History   • Marital status:      Spouse name: Not on file   • Number of children: Not on file   • Years of education: Not on file   • Highest education level: Not on file   Tobacco Use   • Smoking status: Never Smoker   • Smokeless tobacco: Never Used   Substance and Sexual Activity   • Alcohol use: No     Comment: rare   • Drug use: No       No Known Allergies    Current Outpatient Medications on File Prior to Visit   Medication Sig Dispense Refill   • busPIRone (BUSPAR) 7.5 MG tablet Take 1 tablet by mouth 3 (Three) Times a Day. 90 tablet 3   • doxycycline (MONODOX) 50 MG capsule Take 50 mg by mouth Daily.  1   • folic acid (FOLVITE) 1 MG tablet Take 1,000 mcg by mouth Daily.  5   • imatinib (GLEEVEC) 400 MG chemo tablet      • Magnesium 250 MG tablet Take  by mouth.     • metoprolol succinate XL (TOPROL-XL) 50 MG 24 hr tablet Take 50 mg by mouth Daily.     • norethindrone (AYGESTIN) 5 MG tablet Take 5 mg by mouth Daily.  11   • omeprazole (priLOSEC) 20 MG capsule TAKE ONE CAP BY MOUTH DAILY     • sertraline (ZOLOFT) 100 MG tablet Take 1.5 tablets by mouth  "every night at bedtime. 135 tablet 1   • [DISCONTINUED] amoxicillin (AMOXIL) 500 MG capsule Take 2 capsules by mouth 2 (Two) Times a Day. 28 capsule 0     No current facility-administered medications on file prior to visit.        Objective   Vitals:    05/24/19 1132   BP: 100/60   BP Location: Left arm   Patient Position: Sitting   Cuff Size: Adult   Pulse: 87   SpO2: 99%   Weight: 82.6 kg (182 lb)   Height: 157.5 cm (62\")       Physical Exam   Constitutional: She is oriented to person, place, and time. She appears well-developed and well-nourished.   Musculoskeletal:        Right wrist: She exhibits tenderness (small cyst volar surface, is over location of radial artery;  right radial pulse +2/4). She exhibits normal range of motion.   Neurological: She is alert and oriented to person, place, and time.   Skin: Skin is warm and dry.   Psychiatric: She has a normal mood and affect. Her behavior is normal.   Nursing note and vitals reviewed.      Assessment/Plan   Juanita was seen today for wrist pain.    Diagnoses and all orders for this visit:    Ganglion cyst of wrist, right  -     Ambulatory Referral to Hand Surgery    -refer to hand specialist, near radial artery;  Reports pain and would like taken care of.         -Follow up: Prn - RTC if worse or no improvement.    "

## 2019-07-26 RX ORDER — SERTRALINE HYDROCHLORIDE 100 MG/1
TABLET, FILM COATED ORAL
Qty: 135 TABLET | Refills: 1 | Status: SHIPPED | OUTPATIENT
Start: 2019-07-26 | End: 2019-11-18

## 2019-08-15 ENCOUNTER — OFFICE VISIT (OUTPATIENT)
Dept: FAMILY MEDICINE CLINIC | Facility: CLINIC | Age: 49
End: 2019-08-15

## 2019-08-15 VITALS
DIASTOLIC BLOOD PRESSURE: 58 MMHG | BODY MASS INDEX: 33.49 KG/M2 | HEIGHT: 62 IN | HEART RATE: 88 BPM | WEIGHT: 182 LBS | OXYGEN SATURATION: 99 % | SYSTOLIC BLOOD PRESSURE: 90 MMHG

## 2019-08-15 DIAGNOSIS — R53.83 OTHER FATIGUE: ICD-10-CM

## 2019-08-15 DIAGNOSIS — Z81.8 FAMILY HISTORY OF BIPOLAR DISORDER: ICD-10-CM

## 2019-08-15 DIAGNOSIS — Z81.8 FAMILY HISTORY OF ATTENTION DEFICIT HYPERACTIVITY DISORDER (ADHD): ICD-10-CM

## 2019-08-15 DIAGNOSIS — R41.840 CONCENTRATION DEFICIT: ICD-10-CM

## 2019-08-15 DIAGNOSIS — K90.0 CELIAC DISEASE: ICD-10-CM

## 2019-08-15 DIAGNOSIS — C49.A3 GIST (GASTROINTESTINAL STROMAL TUMOR) OF SMALL BOWEL, MALIGNANT (HCC): ICD-10-CM

## 2019-08-15 DIAGNOSIS — F41.9 ANXIETY: Primary | ICD-10-CM

## 2019-08-15 DIAGNOSIS — F32.1 CURRENT MODERATE EPISODE OF MAJOR DEPRESSIVE DISORDER WITHOUT PRIOR EPISODE (HCC): ICD-10-CM

## 2019-08-15 PROCEDURE — 99214 OFFICE O/P EST MOD 30 MIN: CPT | Performed by: FAMILY MEDICINE

## 2019-08-15 RX ORDER — BUSPIRONE HYDROCHLORIDE 10 MG/1
10 TABLET ORAL 3 TIMES DAILY
Qty: 90 TABLET | Refills: 5 | Status: SHIPPED | OUTPATIENT
Start: 2019-08-15 | End: 2019-09-12 | Stop reason: SDUPTHER

## 2019-08-15 RX ORDER — DICYCLOMINE HCL 20 MG
1 TABLET ORAL AS NEEDED
COMMUNITY
Start: 2019-07-26 | End: 2019-11-18

## 2019-08-15 NOTE — PROGRESS NOTES
Subjective   Juanita Burden is a 48 y.o. female. Presents today for   Chief Complaint   Patient presents with   • Anxiety   • Depression       Anxiety   Presents for follow-up visit. Symptoms include decreased concentration, depressed mood, excessive worry, insomnia, irritability, malaise, muscle tension and nervous/anxious behavior. Patient reports no chest pain, palpitations, shortness of breath or suicidal ideas. Primary symptoms comment: Feels very fatigued;  Has celiac disease, but very good about diet.  Reports shopping on line due to stress.  Buspar helps but has to have tid;  zoloft helped at 1st.  Mom has bipolar disorder.  Son has ADHD.  Has hard time completing tasks.. Symptoms occur constantly. The severity of symptoms is moderate. The quality of sleep is fair. Nighttime awakenings: occasional.     Compliance with medications is %. Treatment side effects: No side effcts on medicatoins.       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    Review of Systems   Constitutional: Positive for irritability. Negative for chills and fever.   Respiratory: Negative for shortness of breath.    Cardiovascular: Negative for chest pain and palpitations.   Psychiatric/Behavioral: Positive for decreased concentration and sleep disturbance. Negative for self-injury and suicidal ideas. The patient is nervous/anxious and has insomnia.        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5 MG tablet Take 1 tablet by mouth 3 (Three) Times a Day. 90 tablet 3   • dicyclomine (BENTYL) 20 MG tablet Take 1 tablet by mouth As Needed.     • doxycycline (MONODOX) 50 MG capsule Take 50 mg by mouth Daily.  1   • folic acid (FOLVITE) 1 MG tablet Take 1,000 mcg by mouth Daily.  5   • imatinib (GLEEVEC) 400 MG chemo tablet      • Magnesium 250 MG tablet Take  by mouth.     • metoprolol succinate XL (TOPROL-XL) 50 MG 24 hr tablet Take 50 mg by mouth Daily.     • norethindrone (AYGESTIN) 5 MG tablet Take 5 mg by mouth Daily.  11   • omeprazole (priLOSEC) 20 MG capsule TAKE ONE CAP BY MOUTH DAILY     • sertraline (ZOLOFT) 100 MG tablet TAKE ONE AND ONE-HALF TABLETS EVERY NIGHT AT BEDTIME 135 tablet 1     No current facility-administered medications on file prior to visit.        Objective   Vitals:    08/15/19 0958   BP: 90/58   BP Location: Left arm   Patient Position: Sitting   Cuff Size: Adult   Pulse: 88   SpO2: 99%   Weight: 82.6 kg (182 lb)   Height: 157.5 cm (62\")       Physical Exam   Constitutional: She appears well-developed and well-nourished.   HENT:   Head: Normocephalic and atraumatic.   Neck: Neck supple. No JVD present. No thyromegaly present.   Cardiovascular: Normal rate, regular rhythm and normal heart sounds. Exam reveals no gallop and no friction rub.   No murmur heard.  Pulmonary/Chest: Effort normal and breath sounds normal. No respiratory distress. She has no wheezes. She has no rales.   Abdominal: Soft. Bowel sounds are normal. She exhibits no distension. There is no tenderness. There is no rebound and no guarding.   Musculoskeletal: She exhibits no edema.   Neurological: She is alert.   Skin: Skin is warm and dry.   Psychiatric: Her behavior is normal. Her mood " appears anxious.   Nursing note and vitals reviewed.      Assessment/Plan   Juanita was seen today for anxiety and depression.    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, malignant (CMS/HCC)    Celiac disease    Current moderate episode of major depressive disorder without prior episode (CMS/HCC)  -     Ambulatory Referral to Neuropsychology    Concentration deficit  -     Ambulatory Referral to Neuropsychology    Family history of bipolar disorder  -     Ambulatory Referral to Neuropsychology    Family history of attention deficit hyperactivity disorder (ADHD)  -     Ambulatory Referral to Neuropsychology    Other fatigue  -     CBC & Differential  -     Comprehensive Metabolic Panel  -     TSH  -     T3, Free  -     T4, Free  -     Vitamin B12    -patient with anxiety not controlled;  Having mood swings and irritability;  Has concentration and focus issues for mos of life with both Bipolar and ADHD as family history.  I discussed with options and will increase buspar dose, but prior to any other medication changes highly recommend neuropsych exam to ensure treating appropriate diagnosis.  She is in agreement.  I gave contact information to call for an appt.    -fatigue- check full labs as noted above  -GIST -tumor reviewed CT and considerable shrinkage of tumors, which good  -celiac disease - doing well with diet;  continue       -Follow up: 3 months and prn

## 2019-08-16 LAB
ALBUMIN SERPL-MCNC: 4.7 G/DL (ref 3.5–5.2)
ALBUMIN/GLOB SERPL: 2.4 G/DL
ALP SERPL-CCNC: 42 U/L (ref 39–117)
ALT SERPL-CCNC: 15 U/L (ref 1–33)
AST SERPL-CCNC: 17 U/L (ref 1–32)
BASOPHILS # BLD AUTO: 0.03 10*3/MM3 (ref 0–0.2)
BASOPHILS NFR BLD AUTO: 0.5 % (ref 0–1.5)
BILIRUB SERPL-MCNC: 0.5 MG/DL (ref 0.2–1.2)
BUN SERPL-MCNC: 14 MG/DL (ref 6–20)
BUN/CREAT SERPL: 17.5 (ref 7–25)
CALCIUM SERPL-MCNC: 8.9 MG/DL (ref 8.6–10.5)
CHLORIDE SERPL-SCNC: 105 MMOL/L (ref 98–107)
CO2 SERPL-SCNC: 27.7 MMOL/L (ref 22–29)
CREAT SERPL-MCNC: 0.8 MG/DL (ref 0.57–1)
EOSINOPHIL # BLD AUTO: 0.11 10*3/MM3 (ref 0–0.4)
EOSINOPHIL NFR BLD AUTO: 2 % (ref 0.3–6.2)
ERYTHROCYTE [DISTWIDTH] IN BLOOD BY AUTOMATED COUNT: 13.9 % (ref 12.3–15.4)
GLOBULIN SER CALC-MCNC: 2 GM/DL
GLUCOSE SERPL-MCNC: 79 MG/DL (ref 65–99)
HCT VFR BLD AUTO: 39.7 % (ref 34–46.6)
HGB BLD-MCNC: 12.4 G/DL (ref 12–15.9)
IMM GRANULOCYTES # BLD AUTO: 0.01 10*3/MM3 (ref 0–0.05)
IMM GRANULOCYTES NFR BLD AUTO: 0.2 % (ref 0–0.5)
LYMPHOCYTES # BLD AUTO: 1.33 10*3/MM3 (ref 0.7–3.1)
LYMPHOCYTES NFR BLD AUTO: 24.3 % (ref 19.6–45.3)
MCH RBC QN AUTO: 32.3 PG (ref 26.6–33)
MCHC RBC AUTO-ENTMCNC: 31.2 G/DL (ref 31.5–35.7)
MCV RBC AUTO: 103.4 FL (ref 79–97)
MONOCYTES # BLD AUTO: 0.42 10*3/MM3 (ref 0.1–0.9)
MONOCYTES NFR BLD AUTO: 7.7 % (ref 5–12)
NEUTROPHILS # BLD AUTO: 3.57 10*3/MM3 (ref 1.7–7)
NEUTROPHILS NFR BLD AUTO: 65.3 % (ref 42.7–76)
NRBC BLD AUTO-RTO: 0 /100 WBC (ref 0–0.2)
PLATELET # BLD AUTO: 282 10*3/MM3 (ref 140–450)
POTASSIUM SERPL-SCNC: 4.9 MMOL/L (ref 3.5–5.2)
PROT SERPL-MCNC: 6.7 G/DL (ref 6–8.5)
RBC # BLD AUTO: 3.84 10*6/MM3 (ref 3.77–5.28)
SODIUM SERPL-SCNC: 143 MMOL/L (ref 136–145)
T3FREE SERPL-MCNC: 3 PG/ML (ref 2–4.4)
T4 FREE SERPL-MCNC: 1.05 NG/DL (ref 0.93–1.7)
TSH SERPL DL<=0.005 MIU/L-ACNC: 2.55 MIU/ML (ref 0.27–4.2)
VIT B12 SERPL-MCNC: 522 PG/ML (ref 211–946)
WBC # BLD AUTO: 5.47 10*3/MM3 (ref 3.4–10.8)

## 2019-08-19 DIAGNOSIS — F41.9 ANXIETY: ICD-10-CM

## 2019-08-19 RX ORDER — BUSPIRONE HYDROCHLORIDE 7.5 MG/1
TABLET ORAL
Qty: 90 TABLET | Refills: 3 | Status: SHIPPED | OUTPATIENT
Start: 2019-08-19 | End: 2019-11-18

## 2019-09-12 DIAGNOSIS — F41.9 ANXIETY: ICD-10-CM

## 2019-09-12 RX ORDER — BUSPIRONE HYDROCHLORIDE 10 MG/1
10 TABLET ORAL 3 TIMES DAILY
Qty: 270 TABLET | Refills: 1 | Status: SHIPPED | OUTPATIENT
Start: 2019-09-12 | End: 2019-12-16 | Stop reason: SDUPTHER

## 2019-10-17 ENCOUNTER — CLINICAL SUPPORT (OUTPATIENT)
Dept: FAMILY MEDICINE CLINIC | Facility: CLINIC | Age: 49
End: 2019-10-17

## 2019-10-17 DIAGNOSIS — Z23 FLU VACCINE NEED: ICD-10-CM

## 2019-10-17 DIAGNOSIS — Z23 IMMUNIZATION DUE: Primary | ICD-10-CM

## 2019-10-17 PROCEDURE — 90674 CCIIV4 VAC NO PRSV 0.5 ML IM: CPT | Performed by: NURSE PRACTITIONER

## 2019-10-17 PROCEDURE — 90471 IMMUNIZATION ADMIN: CPT | Performed by: NURSE PRACTITIONER

## 2019-10-17 PROCEDURE — 90472 IMMUNIZATION ADMIN EACH ADD: CPT | Performed by: NURSE PRACTITIONER

## 2019-10-17 PROCEDURE — 90732 PPSV23 VACC 2 YRS+ SUBQ/IM: CPT | Performed by: NURSE PRACTITIONER

## 2019-11-18 ENCOUNTER — OFFICE VISIT (OUTPATIENT)
Dept: FAMILY MEDICINE CLINIC | Facility: CLINIC | Age: 49
End: 2019-11-18

## 2019-11-18 VITALS
SYSTOLIC BLOOD PRESSURE: 110 MMHG | DIASTOLIC BLOOD PRESSURE: 70 MMHG | OXYGEN SATURATION: 99 % | HEART RATE: 72 BPM | BODY MASS INDEX: 35.3 KG/M2 | WEIGHT: 193 LBS | TEMPERATURE: 98.6 F

## 2019-11-18 DIAGNOSIS — F41.8 DEPRESSION WITH ANXIETY: Primary | ICD-10-CM

## 2019-11-18 PROCEDURE — 99214 OFFICE O/P EST MOD 30 MIN: CPT | Performed by: FAMILY MEDICINE

## 2019-11-18 RX ORDER — FLUOXETINE HYDROCHLORIDE 20 MG/1
20 CAPSULE ORAL DAILY
Qty: 30 CAPSULE | Refills: 5 | Status: SHIPPED | OUTPATIENT
Start: 2019-11-18 | End: 2019-12-16 | Stop reason: SDUPTHER

## 2019-11-18 RX ORDER — FLUOXETINE 10 MG/1
CAPSULE ORAL
Qty: 14 CAPSULE | Refills: 0 | Status: SHIPPED | OUTPATIENT
Start: 2019-11-18 | End: 2020-01-14 | Stop reason: DRUGHIGH

## 2019-11-18 RX ORDER — PANTOPRAZOLE SODIUM 40 MG/1
40 TABLET, DELAYED RELEASE ORAL DAILY
COMMUNITY
End: 2020-01-14

## 2019-11-18 NOTE — PROGRESS NOTES
Subjective   Juanita Burden is a 48 y.o. female. Presents today for   Chief Complaint   Patient presents with   • Follow-up     had evaluation with jordan   • Depression       Depression   Visit Type: follow-up  Patient presents with the following symptoms: anhedonia, decreased concentration, depressed mood, excessive worry, fatigue, feelings of hopelessness, feelings of worthlessness, insomnia, irritability, muscle tension, nervousness/anxiety and thoughts of death.  Patient is not experiencing: suicidal ideas and suicidal planning.  Frequency of symptoms: constantly   Severity: severe   Sleep quality: fair  Nighttime awakenings: occasional  Compliance with medications:  %  Treatment side effects: no side effects but doesn't feel zoloft and buspar are working.  asked about prozac or trintellix or pristiq.    Had neuropsych exam completed as fam hx of adhd, bipolar;  Does not have features of this but does have depression and FILEMON.  Has stage IV GIST cancer, Mom moved in and patient's son with 5 y/o child now moved in.  They have adopted as well.  She is under tremendous stress.      Review of Systems   Constitutional: Positive for irritability.   Psychiatric/Behavioral: Positive for decreased concentration. Negative for suicidal ideas. The patient is nervous/anxious and has insomnia.        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, malignant (CMS/HCC)   • Tachycardia   • Anemia   • Anxiety   • Celiac disease       Social History     Socioeconomic History   • Marital status:      Spouse name: Not on file   • Number of children: Not on file   • Years of education: Not on file   • Highest education level: Not on file   Tobacco Use   • Smoking status: Never Smoker   • Smokeless tobacco: Never Used   Substance and Sexual Activity   • Alcohol use: No     Comment: rare   • Drug use: No       No Known Allergies    Current  Outpatient Medications on File Prior to Visit   Medication Sig Dispense Refill   • busPIRone (BUSPAR) 10 MG tablet Take 1 tablet by mouth 3 (Three) Times a Day. 270 tablet 1   • doxycycline (MONODOX) 50 MG capsule Take 50 mg by mouth Daily.  1   • folic acid (FOLVITE) 1 MG tablet Take 1,000 mcg by mouth Daily.  5   • imatinib (GLEEVEC) 400 MG chemo tablet      • metoprolol succinate XL (TOPROL-XL) 50 MG 24 hr tablet Take 50 mg by mouth Daily.     • norethindrone (AYGESTIN) 5 MG tablet Take 5 mg by mouth Daily.  11   • pantoprazole (PROTONIX) 40 MG EC tablet Take 40 mg by mouth Daily.     • sertraline (ZOLOFT) 100 MG tablet TAKE ONE AND ONE-HALF TABLETS EVERY NIGHT AT BEDTIME 135 tablet 1   • busPIRone (BUSPAR) 7.5 MG tablet TAKE 1 TABLET BY MOUTH THREE TIMES A DAY 90 tablet 3   • dicyclomine (BENTYL) 20 MG tablet Take 1 tablet by mouth As Needed.     • Magnesium 250 MG tablet Take  by mouth.     • omeprazole (priLOSEC) 20 MG capsule TAKE ONE CAP BY MOUTH DAILY       No current facility-administered medications on file prior to visit.        Objective   Vitals:    11/18/19 1037   BP: 110/70   Pulse: 72   Temp: 98.6 °F (37 °C)   SpO2: 99%   Weight: 87.5 kg (193 lb)       Physical Exam   Constitutional: She appears well-developed and well-nourished.   HENT:   Head: Normocephalic and atraumatic.   Neck: Neck supple. No JVD present. No thyromegaly present.   Cardiovascular: Normal rate, regular rhythm and normal heart sounds. Exam reveals no gallop and no friction rub.   No murmur heard.  Pulmonary/Chest: Effort normal and breath sounds normal. No respiratory distress. She has no wheezes. She has no rales.   Abdominal: Soft. Bowel sounds are normal. She exhibits no distension. There is no tenderness. There is no rebound and no guarding.   Musculoskeletal: She exhibits no edema.   Neurological: She is alert.   Skin: Skin is warm and dry.   Psychiatric: Her behavior is normal. Her mood appears anxious. She exhibits a  depressed mood.   Nursing note and vitals reviewed.      Assessment/Plan   Juanita was seen today for follow-up and depression.    Diagnoses and all orders for this visit:    Depression with anxiety  -     sertraline (ZOLOFT) 50 MG tablet; 1 po daily x 7 days, then 1/2 po daily x 7 days, then 1/2 po every other day x 7 days then stop  -     FLUoxetine (PROZAC) 10 MG capsule; 1 po daily x 14 days, then go to 20mg (start when down to 1/2 of 50mg of zoloft).  -     FLUoxetine (PROZAC) 20 MG capsule; Take 1 capsule by mouth Daily.    I will wean off zoloft and start prozac after starts wean;  Seek care immediately if any thoughts of self harm;  If does well, will take off buspar, but keep for now.         -Follow up: 8 weeks and prn

## 2019-12-02 DIAGNOSIS — F41.8 DEPRESSION WITH ANXIETY: ICD-10-CM

## 2019-12-16 DIAGNOSIS — F41.8 DEPRESSION WITH ANXIETY: ICD-10-CM

## 2019-12-16 DIAGNOSIS — F41.9 ANXIETY: ICD-10-CM

## 2019-12-16 RX ORDER — BUSPIRONE HYDROCHLORIDE 10 MG/1
10 TABLET ORAL 3 TIMES DAILY
Qty: 270 TABLET | Refills: 1 | Status: SHIPPED | OUTPATIENT
Start: 2019-12-16 | End: 2020-01-14

## 2019-12-16 RX ORDER — FLUOXETINE HYDROCHLORIDE 20 MG/1
20 CAPSULE ORAL DAILY
Qty: 90 CAPSULE | Refills: 1 | Status: SHIPPED | OUTPATIENT
Start: 2019-12-16 | End: 2019-12-19 | Stop reason: SDUPTHER

## 2019-12-19 DIAGNOSIS — F41.8 DEPRESSION WITH ANXIETY: ICD-10-CM

## 2019-12-19 RX ORDER — FLUOXETINE HYDROCHLORIDE 20 MG/1
20 CAPSULE ORAL DAILY
Qty: 90 CAPSULE | Refills: 1 | Status: SHIPPED | OUTPATIENT
Start: 2019-12-19 | End: 2020-04-14 | Stop reason: SDUPTHER

## 2020-01-14 ENCOUNTER — OFFICE VISIT (OUTPATIENT)
Dept: FAMILY MEDICINE CLINIC | Facility: CLINIC | Age: 50
End: 2020-01-14

## 2020-01-14 VITALS
SYSTOLIC BLOOD PRESSURE: 100 MMHG | BODY MASS INDEX: 35.7 KG/M2 | WEIGHT: 194 LBS | DIASTOLIC BLOOD PRESSURE: 70 MMHG | HEIGHT: 62 IN | OXYGEN SATURATION: 99 % | HEART RATE: 73 BPM

## 2020-01-14 DIAGNOSIS — F41.8 DEPRESSION WITH ANXIETY: ICD-10-CM

## 2020-01-14 DIAGNOSIS — F41.9 ANXIETY: Primary | ICD-10-CM

## 2020-01-14 PROCEDURE — 99213 OFFICE O/P EST LOW 20 MIN: CPT | Performed by: FAMILY MEDICINE

## 2020-01-14 RX ORDER — BACLOFEN 5 MG/1
1 TABLET ORAL
COMMUNITY
Start: 2019-12-26 | End: 2020-12-14

## 2020-01-14 NOTE — PROGRESS NOTES
Subjective   Juanita Burden is a 49 y.o. female. Presents today for   Chief Complaint   Patient presents with   • Anxiety   • Depression       Anxiety   Presents for follow-up visit. Patient reports no depressed mood, excessive worry or nervous/anxious behavior. Symptoms occur occasionally. The severity of symptoms is mild. The quality of sleep is fair. Nighttime awakenings: occasional.     Compliance with medications is %. Treatment side effects: no side effects and doing better on prozac;  would like wean off buspar and call if needs up dose on prozac.       Patient with GIST tumor, had CT abd/pelvis 11/22/19 noted no change in size.  Will see oncology June 2020 and scan prior to see.  Review of Systems   Psychiatric/Behavioral: The patient is not nervous/anxious.        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, malignant (CMS/HCC)   • Tachycardia   • Anemia   • Anxiety   • Celiac disease       Social History     Socioeconomic History   • Marital status:      Spouse name: Not on file   • Number of children: Not on file   • Years of education: Not on file   • Highest education level: Not on file   Tobacco Use   • Smoking status: Never Smoker   • Smokeless tobacco: Never Used   Substance and Sexual Activity   • Alcohol use: No     Comment: rare   • Drug use: No       No Known Allergies    Current Outpatient Medications on File Prior to Visit   Medication Sig Dispense Refill   • Baclofen 5 MG tablet Take 1 tablet by mouth every night at bedtime.     • busPIRone (BUSPAR) 10 MG tablet Take 1 tablet by mouth 3 (Three) Times a Day. 270 tablet 1   • doxycycline (MONODOX) 50 MG capsule Take 50 mg by mouth Daily.  1   • FLUoxetine (PROZAC) 20 MG capsule Take 1 capsule by mouth Daily. 90 capsule 1   • folic acid (FOLVITE) 1 MG tablet Take 1,000 mcg by mouth Daily.  5   • imatinib (GLEEVEC) 400 MG chemo tablet      • metoprolol  "succinate XL (TOPROL-XL) 50 MG 24 hr tablet Take 50 mg by mouth Daily.     • norethindrone (AYGESTIN) 5 MG tablet Take 5 mg by mouth Daily.  11   • [DISCONTINUED] FLUoxetine (PROZAC) 10 MG capsule 1 po daily x 14 days, then go to 20mg (start when down to 1/2 of 50mg of zoloft). 14 capsule 0   • [DISCONTINUED] pantoprazole (PROTONIX) 40 MG EC tablet Take 40 mg by mouth Daily.     • [DISCONTINUED] sertraline (ZOLOFT) 50 MG tablet 1 po daily x 7 days, then 1/2 po daily x 7 days, then 1/2 po every other day x 7 days then stop 14 tablet 0     No current facility-administered medications on file prior to visit.        Objective   Vitals:    01/14/20 1133   BP: 100/70   BP Location: Left arm   Patient Position: Sitting   Cuff Size: Adult   Pulse: 73   SpO2: 99%   Weight: 88 kg (194 lb)   Height: 157.5 cm (62\")       Physical Exam   Constitutional: She is oriented to person, place, and time. She appears well-developed and well-nourished.   Neurological: She is alert and oriented to person, place, and time.   Skin: Skin is warm and dry.   Psychiatric: She has a normal mood and affect. Her behavior is normal.   Nursing note and vitals reviewed.      Assessment/Plan   Juanita was seen today for anxiety and depression.    Diagnoses and all orders for this visit:    Anxiety        buspar 1/2 po bid x 7 days, then 1/2 po qd x 7 days then stop  If increase in activity will increase prozac.         -Follow up: 3 months and prn  "

## 2020-03-16 ENCOUNTER — OFFICE VISIT (OUTPATIENT)
Dept: FAMILY MEDICINE CLINIC | Facility: CLINIC | Age: 50
End: 2020-03-16

## 2020-03-16 VITALS
SYSTOLIC BLOOD PRESSURE: 118 MMHG | WEIGHT: 201 LBS | BODY MASS INDEX: 36.99 KG/M2 | HEIGHT: 62 IN | DIASTOLIC BLOOD PRESSURE: 74 MMHG | HEART RATE: 75 BPM | OXYGEN SATURATION: 99 % | TEMPERATURE: 98.8 F

## 2020-03-16 DIAGNOSIS — H66.001 NON-RECURRENT ACUTE SUPPURATIVE OTITIS MEDIA OF RIGHT EAR WITHOUT SPONTANEOUS RUPTURE OF TYMPANIC MEMBRANE: ICD-10-CM

## 2020-03-16 DIAGNOSIS — J45.21 MILD INTERMITTENT ASTHMA WITH ACUTE EXACERBATION: Primary | ICD-10-CM

## 2020-03-16 PROCEDURE — 96372 THER/PROPH/DIAG INJ SC/IM: CPT | Performed by: NURSE PRACTITIONER

## 2020-03-16 PROCEDURE — 99213 OFFICE O/P EST LOW 20 MIN: CPT | Performed by: NURSE PRACTITIONER

## 2020-03-16 RX ORDER — BUSPIRONE HYDROCHLORIDE 7.5 MG/1
7.5 TABLET ORAL 3 TIMES DAILY
COMMUNITY
End: 2020-07-22

## 2020-03-16 RX ORDER — METHYLPREDNISOLONE ACETATE 80 MG/ML
80 INJECTION, SUSPENSION INTRA-ARTICULAR; INTRALESIONAL; INTRAMUSCULAR; SOFT TISSUE ONCE
Status: COMPLETED | OUTPATIENT
Start: 2020-03-16 | End: 2020-03-16

## 2020-03-16 RX ORDER — AMOXICILLIN 500 MG/1
1000 CAPSULE ORAL 3 TIMES DAILY
Qty: 40 CAPSULE | Refills: 0 | Status: SHIPPED | OUTPATIENT
Start: 2020-03-16 | End: 2020-05-11

## 2020-03-16 RX ADMIN — METHYLPREDNISOLONE ACETATE 80 MG: 80 INJECTION, SUSPENSION INTRA-ARTICULAR; INTRALESIONAL; INTRAMUSCULAR; SOFT TISSUE at 11:49

## 2020-03-16 NOTE — PATIENT INSTRUCTIONS

## 2020-03-16 NOTE — PROGRESS NOTES
"Subjective   Juanita Burden is a 49 y.o. female who presents c/o sore throat, sinus pressure, cough, congestion x 10 days.     History of Present Illness   Ran a fever 2 days ago only, was 100, has cough as well. Does flonase zyrtec combination this time of year and usually manages. Does not feel SOA, but labored. Cough is mostly dry. Is wheezing at night.  The following portions of the patient's history were reviewed and updated as appropriate: allergies, current medications, past family history, past medical history, past social history, past surgical history and problem list.    Review of Systems   Constitutional: Positive for fatigue and fever. Negative for chills.   HENT: Positive for ear pain, rhinorrhea, sinus pressure and sore throat. Negative for congestion.    Respiratory: Positive for cough. Negative for shortness of breath and wheezing.    Cardiovascular: Negative.    Musculoskeletal: Positive for arthralgias.   Neurological: Positive for headache.   Psychiatric/Behavioral: Negative.      /74   Pulse 75   Temp 98.8 °F (37.1 °C) (Oral)   Ht 157.5 cm (62\")   Wt 91.2 kg (201 lb)   SpO2 99%   BMI 36.76 kg/m²     Objective   Physical Exam   Constitutional: She is oriented to person, place, and time. She appears well-developed and well-nourished. No distress.   HENT:   Right Ear: Tympanic membrane is bulging. A middle ear effusion is present.   Left Ear: A middle ear effusion is present.   Nose: Rhinorrhea present. Right sinus exhibits no maxillary sinus tenderness and no frontal sinus tenderness. Left sinus exhibits no maxillary sinus tenderness and no frontal sinus tenderness.   Mouth/Throat: Uvula is midline, oropharynx is clear and moist and mucous membranes are normal.   Neck: Normal range of motion. Neck supple. No tracheal deviation present. No thyromegaly present.   Cardiovascular: Normal rate, regular rhythm and normal heart sounds.   Pulmonary/Chest: Effort normal and breath sounds normal. " No respiratory distress. She has no wheezes.   Dry cough   Lymphadenopathy:     She has no cervical adenopathy.   Neurological: She is alert and oriented to person, place, and time.   Skin: Skin is warm and dry. Capillary refill takes less than 2 seconds.   Psychiatric: She has a normal mood and affect. Her behavior is normal. Judgment and thought content normal.   Nursing note and vitals reviewed.    Assessment/Plan   Problems Addressed this Visit     None      Visit Diagnoses     Mild intermittent asthma with acute exacerbation    -  Primary    Relevant Medications    methylPREDNISolone acetate (DEPO-medrol) injection 80 mg (Start on 3/16/2020 11:44 AM)    Non-recurrent acute suppurative otitis media of right ear without spontaneous rupture of tympanic membrane        Relevant Medications    amoxicillin (AMOXIL) 500 MG capsule        Does get ear infections/sinus infections and asthma exacerbations. Is immunosuppressed on Gleevac for GIST. Discussed presumption of ear as  of symptom complex. This does not rule out coronavirus, but does not meet criteria for testing. Should self quarantine and start antibiotics. Discussed virus can she longer if we place on steroids, but again, assume her typical seasonal flare with infection. Response to antibiotics likely diagnostic.instructions for quarantine given

## 2020-04-14 ENCOUNTER — TELEMEDICINE (OUTPATIENT)
Dept: FAMILY MEDICINE CLINIC | Facility: CLINIC | Age: 50
End: 2020-04-14

## 2020-04-14 VITALS — RESPIRATION RATE: 12 BRPM

## 2020-04-14 DIAGNOSIS — F41.8 DEPRESSION WITH ANXIETY: ICD-10-CM

## 2020-04-14 DIAGNOSIS — F41.9 ANXIETY: Primary | ICD-10-CM

## 2020-04-14 PROCEDURE — 99213 OFFICE O/P EST LOW 20 MIN: CPT | Performed by: FAMILY MEDICINE

## 2020-04-14 RX ORDER — FLUOXETINE HYDROCHLORIDE 40 MG/1
40 CAPSULE ORAL DAILY
Qty: 30 CAPSULE | Refills: 2 | Status: SHIPPED | OUTPATIENT
Start: 2020-04-14 | End: 2020-05-11 | Stop reason: SDUPTHER

## 2020-04-14 NOTE — PROGRESS NOTES
Subjective   Juanita Burden is a 49 y.o. female. Presents today for   Chief Complaint   Patient presents with   • Anxiety       Anxiety   Presents for follow-up visit. Symptoms include decreased concentration and nervous/anxious behavior. Patient reports no chest pain, depressed mood, excessive worry or suicidal ideas. Symptoms occur occasionally. Current severity: doing ok on prozac and buspar. The quality of sleep is fair.     Compliance with medications is %. Treatment side effects: Was unable wean off buspar as increased anxiety.  Would like come off buspar as tid and be on single agent.   Patient with GIST, on gleevac.  Doing ok;  Was sick in hospital 1 month ago, tested negative for COVID 19 thankfully.  Symptoms all resolved.      Review of Systems   Cardiovascular: Negative for chest pain.   Psychiatric/Behavioral: Positive for decreased concentration. Negative for suicidal ideas. The patient is nervous/anxious.        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, malignant (CMS/HCC)   • Tachycardia   • Anemia   • Anxiety   • Celiac disease       Social History     Socioeconomic History   • Marital status:      Spouse name: Not on file   • Number of children: Not on file   • Years of education: Not on file   • Highest education level: Not on file   Tobacco Use   • Smoking status: Never Smoker   • Smokeless tobacco: Never Used   Substance and Sexual Activity   • Alcohol use: No     Comment: rare   • Drug use: No       No Known Allergies    Current Outpatient Medications on File Prior to Visit   Medication Sig Dispense Refill   • amoxicillin (AMOXIL) 500 MG capsule Take 2 capsules by mouth 3 (Three) Times a Day. 40 capsule 0   • Baclofen 5 MG tablet Take 1 tablet by mouth every night at bedtime.     • busPIRone (BUSPAR) 7.5 MG tablet Take 7.5 mg by mouth 3 (Three) Times a Day.     • FLUoxetine (PROZAC) 20 MG capsule  Take 1 capsule by mouth Daily. 90 capsule 1   • folic acid (FOLVITE) 1 MG tablet Take 1,000 mcg by mouth Daily.  5   • imatinib (GLEEVEC) 400 MG chemo tablet      • metoprolol succinate XL (TOPROL-XL) 50 MG 24 hr tablet Take 50 mg by mouth Daily.     • norethindrone (AYGESTIN) 5 MG tablet Take 5 mg by mouth Daily.  11     No current facility-administered medications on file prior to visit.        Objective   There were no vitals filed for this visit.  There is no height or weight on file to calculate BMI.    Physical Exam   Constitutional: She is oriented to person, place, and time. She appears well-developed and well-nourished. No distress.   HENT:   Head: Normocephalic and atraumatic.   Pulmonary/Chest: Breath sounds normal. No respiratory distress.   Neurological: She is alert and oriented to person, place, and time.   Skin: She is not diaphoretic.   Psychiatric: She has a normal mood and affect. Her speech is normal and behavior is normal. Judgment and thought content normal.   Nursing note and vitals reviewed.      Assessment/Plan   Juanita was seen today for anxiety.    Diagnoses and all orders for this visit:    Anxiety    Depression with anxiety  -     FLUoxetine (PROzac) 40 MG capsule; Take 1 capsule by mouth Daily.    will increase prozac to 40mg po daily.  To call in 2 to 3 weeks on how doing.  IF tolerating and anxiety doing well, will attempt another wean of buspar slowly.         -Follow up: will arrange after pandemic, call in 2 to 3 weeks on how doing.  Seek care if not doing well.

## 2020-05-11 DIAGNOSIS — F41.8 DEPRESSION WITH ANXIETY: ICD-10-CM

## 2020-05-11 PROBLEM — C26.9: Status: ACTIVE | Noted: 2020-05-11

## 2020-05-11 RX ORDER — FLUOXETINE HYDROCHLORIDE 40 MG/1
40 CAPSULE ORAL DAILY
Qty: 90 CAPSULE | Refills: 1 | Status: SHIPPED | OUTPATIENT
Start: 2020-05-11 | End: 2020-12-21

## 2020-06-27 DIAGNOSIS — F41.9 ANXIETY: ICD-10-CM

## 2020-06-29 RX ORDER — BUSPIRONE HYDROCHLORIDE 10 MG/1
TABLET ORAL
Qty: 270 TABLET | Refills: 1 | OUTPATIENT
Start: 2020-06-29

## 2020-07-22 ENCOUNTER — OFFICE VISIT (OUTPATIENT)
Dept: FAMILY MEDICINE CLINIC | Facility: CLINIC | Age: 50
End: 2020-07-22

## 2020-07-22 VITALS
WEIGHT: 198 LBS | SYSTOLIC BLOOD PRESSURE: 104 MMHG | HEART RATE: 97 BPM | OXYGEN SATURATION: 98 % | HEIGHT: 62 IN | DIASTOLIC BLOOD PRESSURE: 68 MMHG | BODY MASS INDEX: 36.44 KG/M2

## 2020-07-22 DIAGNOSIS — I80.01 THROMBOPHLEBITIS OF SUPERFICIAL VEINS OF RIGHT LOWER EXTREMITY: Primary | ICD-10-CM

## 2020-07-22 PROCEDURE — 99213 OFFICE O/P EST LOW 20 MIN: CPT | Performed by: FAMILY MEDICINE

## 2020-07-22 NOTE — PROGRESS NOTES
Subjective   Juantia Burden is a 49 y.o. female. Presents today for   Chief Complaint   Patient presents with   • Leg Pain     right - ER Follow up    • Annual Exam     wellness       Leg Pain    The incident occurred more than 1 week ago. There was no injury mechanism. Pain location: right calf pain and swelling. The quality of the pain is described as aching and cramping. The pain is moderate. The pain has been constant since onset. Pertinent negatives include no numbness or tingling. The symptoms are aggravated by palpation. She has tried NSAIDs for the symptoms. The treatment provided moderate relief.   seen in ER superfical below the knee thrombophlebitis.    Review of Systems   Neurological: Negative for tingling and numbness.       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, malignant (CMS/HCC)   • Tachycardia   • Anemia   • Anxiety   • Celiac disease   • Malignant neoplasm of gastrointestinal tract (CMS/HCC)       Social History     Socioeconomic History   • Marital status:      Spouse name: Not on file   • Number of children: Not on file   • Years of education: Not on file   • Highest education level: Not on file   Tobacco Use   • Smoking status: Never Smoker   • Smokeless tobacco: Never Used   Substance and Sexual Activity   • Alcohol use: No     Comment: rare   • Drug use: No       No Known Allergies    Current Outpatient Medications on File Prior to Visit   Medication Sig Dispense Refill   • Baclofen 5 MG tablet Take 1 tablet by mouth every night at bedtime.     • FLUoxetine (PROzac) 40 MG capsule Take 1 capsule by mouth Daily. 90 capsule 1   • folic acid (FOLVITE) 1 MG tablet Take 1,000 mcg by mouth Daily.  5   • imatinib (GLEEVEC) 400 MG chemo tablet      • norethindrone (AYGESTIN) 5 MG tablet Take 5 mg by mouth Daily.  11   • [DISCONTINUED] busPIRone (BUSPAR) 7.5 MG tablet Take 7.5 mg by mouth 3 (Three) Times a  "Day.     • [DISCONTINUED] metoprolol succinate XL (TOPROL-XL) 50 MG 24 hr tablet Take 50 mg by mouth Daily.       No current facility-administered medications on file prior to visit.        Objective   Vitals:    07/22/20 1054   BP: 104/68   BP Location: Left arm   Patient Position: Sitting   Cuff Size: Adult   Pulse: 97   SpO2: 98%   Weight: 89.8 kg (198 lb)   Height: 157.5 cm (62\")     Body mass index is 36.21 kg/m².    Physical Exam   Constitutional: She is oriented to person, place, and time. She appears well-developed and well-nourished.   Musculoskeletal: She exhibits no edema (right calf mild pain, but swelling improved, pain improved).   Neurological: She is alert and oriented to person, place, and time.   Skin: Skin is warm and dry.   Psychiatric: She has a normal mood and affect. Her behavior is normal.   Nursing note and vitals reviewed.      Assessment/Plan   Juanita was seen today for leg pain.    Diagnoses and all orders for this visit:    Thrombophlebitis of superficial veins of right lower extremity  -     Duplex Venous Lower Extremity - Right CAR; Future    asa bid or ibuprofen as doing  Recommend walk more when drives long distances and compression hose.         -Follow up: Prn - RTC if worse or no improvement.    "

## 2020-08-06 ENCOUNTER — HOSPITAL ENCOUNTER (OUTPATIENT)
Dept: CARDIOLOGY | Facility: HOSPITAL | Age: 50
Discharge: HOME | End: 2020-08-06
Admitting: FAMILY MEDICINE

## 2020-08-06 DIAGNOSIS — I80.01 THROMBOPHLEBITIS OF SUPERFICIAL VEINS OF RIGHT LOWER EXTREMITY: ICD-10-CM

## 2020-08-06 LAB
BH CV LOW VAS RIGHT VARICOSITY BK VESSEL: 1
BH CV LOWER VASCULAR LEFT COMMON FEMORAL AUGMENT: NORMAL
BH CV LOWER VASCULAR LEFT COMMON FEMORAL COMPETENT: NORMAL
BH CV LOWER VASCULAR LEFT COMMON FEMORAL COMPRESS: NORMAL
BH CV LOWER VASCULAR LEFT COMMON FEMORAL PHASIC: NORMAL
BH CV LOWER VASCULAR LEFT COMMON FEMORAL SPONT: NORMAL
BH CV LOWER VASCULAR RIGHT COMMON FEMORAL AUGMENT: NORMAL
BH CV LOWER VASCULAR RIGHT COMMON FEMORAL COMPETENT: NORMAL
BH CV LOWER VASCULAR RIGHT COMMON FEMORAL COMPRESS: NORMAL
BH CV LOWER VASCULAR RIGHT COMMON FEMORAL PHASIC: NORMAL
BH CV LOWER VASCULAR RIGHT COMMON FEMORAL SPONT: NORMAL
BH CV LOWER VASCULAR RIGHT DISTAL FEMORAL COMPRESS: NORMAL
BH CV LOWER VASCULAR RIGHT GASTRONEMIUS COMPRESS: NORMAL
BH CV LOWER VASCULAR RIGHT GREATER SAPH AK COMPRESS: NORMAL
BH CV LOWER VASCULAR RIGHT GREATER SAPH BK COMPRESS: NORMAL
BH CV LOWER VASCULAR RIGHT MID FEMORAL AUGMENT: NORMAL
BH CV LOWER VASCULAR RIGHT MID FEMORAL COMPETENT: NORMAL
BH CV LOWER VASCULAR RIGHT MID FEMORAL COMPRESS: NORMAL
BH CV LOWER VASCULAR RIGHT MID FEMORAL PHASIC: NORMAL
BH CV LOWER VASCULAR RIGHT MID FEMORAL SPONT: NORMAL
BH CV LOWER VASCULAR RIGHT PERONEAL COMPRESS: NORMAL
BH CV LOWER VASCULAR RIGHT POPLITEAL AUGMENT: NORMAL
BH CV LOWER VASCULAR RIGHT POPLITEAL COMPETENT: NORMAL
BH CV LOWER VASCULAR RIGHT POPLITEAL COMPRESS: NORMAL
BH CV LOWER VASCULAR RIGHT POPLITEAL PHASIC: NORMAL
BH CV LOWER VASCULAR RIGHT POPLITEAL SPONT: NORMAL
BH CV LOWER VASCULAR RIGHT POSTERIOR TIBIAL COMPRESS: NORMAL
BH CV LOWER VASCULAR RIGHT PROXIMAL FEMORAL COMPRESS: NORMAL
BH CV LOWER VASCULAR RIGHT SAPHENOFEMORAL JUNCTION COMPRESS: NORMAL
BH CV LOWER VASCULAR RIGHT VARICOSITY BK COMPRESS: NORMAL
BH CV LOWER VASCULAR RIGHT VARICOSITY BK THROMBUS: NORMAL

## 2020-08-06 PROCEDURE — 93971 EXTREMITY STUDY: CPT

## 2020-09-20 DIAGNOSIS — F41.9 ANXIETY: ICD-10-CM

## 2020-09-21 RX ORDER — BUSPIRONE HYDROCHLORIDE 10 MG/1
TABLET ORAL
Qty: 270 TABLET | Refills: 1 | OUTPATIENT
Start: 2020-09-21

## 2020-12-14 ENCOUNTER — OFFICE VISIT (OUTPATIENT)
Dept: FAMILY MEDICINE CLINIC | Facility: CLINIC | Age: 50
End: 2020-12-14

## 2020-12-14 VITALS
HEART RATE: 97 BPM | BODY MASS INDEX: 37.73 KG/M2 | DIASTOLIC BLOOD PRESSURE: 62 MMHG | SYSTOLIC BLOOD PRESSURE: 112 MMHG | HEIGHT: 62 IN | OXYGEN SATURATION: 98 % | WEIGHT: 205 LBS

## 2020-12-14 DIAGNOSIS — Z23 FLU VACCINE NEED: ICD-10-CM

## 2020-12-14 DIAGNOSIS — M72.2 PLANTAR FASCIITIS, BILATERAL: Primary | ICD-10-CM

## 2020-12-14 PROCEDURE — 99214 OFFICE O/P EST MOD 30 MIN: CPT | Performed by: FAMILY MEDICINE

## 2020-12-14 PROCEDURE — 90686 IIV4 VACC NO PRSV 0.5 ML IM: CPT | Performed by: FAMILY MEDICINE

## 2020-12-14 PROCEDURE — 90471 IMMUNIZATION ADMIN: CPT | Performed by: FAMILY MEDICINE

## 2020-12-14 RX ORDER — METOPROLOL SUCCINATE 50 MG/1
50 TABLET, EXTENDED RELEASE ORAL DAILY
COMMUNITY
End: 2021-07-12

## 2020-12-14 RX ORDER — HYDROXYZINE HYDROCHLORIDE 25 MG/1
1 TABLET, FILM COATED ORAL 2 TIMES DAILY PRN
COMMUNITY
Start: 2020-09-25 | End: 2021-07-12

## 2020-12-14 RX ORDER — BUSPIRONE HYDROCHLORIDE 7.5 MG/1
7.5 TABLET ORAL 2 TIMES DAILY
COMMUNITY
End: 2021-01-04 | Stop reason: SDUPTHER

## 2020-12-14 RX ORDER — DICLOFENAC SODIUM 75 MG/1
75 TABLET, DELAYED RELEASE ORAL 2 TIMES DAILY
Qty: 60 TABLET | Refills: 0 | Status: SHIPPED | OUTPATIENT
Start: 2020-12-14 | End: 2021-08-05

## 2020-12-14 NOTE — PROGRESS NOTES
Subjective   Juanita Burden is a 50 y.o. female. Presents today for   Chief Complaint   Patient presents with   • Foot Pain       Foot Pain  This is a chronic problem. The current episode started more than 1 month ago. The problem occurs intermittently. The problem has been gradually worsening. Associated symptoms include arthralgias. Pertinent negatives include no joint swelling. Associated symptoms comments: Worse 1st thing am.. The symptoms are aggravated by standing and walking (barefoot). Treatments tried: heel cups. The treatment provided moderate relief.   Anxiety  Presents for follow-up visit. Patient reports no excessive worry or nervous/anxious behavior. Symptoms occur occasionally. The quality of sleep is fair. Nighttime awakenings: occasional.     Compliance with medications is %. Treatment side effects: dizzines wtih 10mg buspar; much better on 7.5mg wtih the prozac 40mg.   no prior foot surgery    Review of Systems   Musculoskeletal: Positive for arthralgias. Negative for joint swelling.   Psychiatric/Behavioral: The patient is not nervous/anxious.        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, malignant (CMS/HCC)   • Tachycardia   • Anemia   • Anxiety   • Celiac disease   • Malignant neoplasm of gastrointestinal tract (CMS/HCC)       Social History     Socioeconomic History   • Marital status:      Spouse name: Not on file   • Number of children: Not on file   • Years of education: Not on file   • Highest education level: Not on file   Tobacco Use   • Smoking status: Never Smoker   • Smokeless tobacco: Never Used   Substance and Sexual Activity   • Alcohol use: No     Comment: rare   • Drug use: No       No Known Allergies    Current Outpatient Medications on File Prior to Visit   Medication Sig Dispense Refill   • busPIRone (BUSPAR) 7.5 MG tablet Take 7.5 mg by mouth 2 (Two) Times a Day.     •  "FLUoxetine (PROzac) 40 MG capsule Take 1 capsule by mouth Daily. 90 capsule 1   • folic acid (FOLVITE) 1 MG tablet Take 1,000 mcg by mouth Daily.  5   • hydrOXYzine (ATARAX) 25 MG tablet Take 1 tablet by mouth 2 (Two) Times a Day As Needed.     • imatinib (GLEEVEC) 400 MG chemo tablet      • metoprolol succinate XL (TOPROL-XL) 50 MG 24 hr tablet Take 50 mg by mouth Daily.     • norethindrone (AYGESTIN) 5 MG tablet Take 5 mg by mouth Daily.  11   • [DISCONTINUED] Baclofen 5 MG tablet Take 1 tablet by mouth every night at bedtime.       No current facility-administered medications on file prior to visit.        Objective   Vitals:    12/14/20 1645   BP: 112/62   Pulse: 97   SpO2: 98%   Weight: 93 kg (205 lb)   Height: 157.5 cm (62\")     Body mass index is 37.49 kg/m².    Physical Exam  Vitals signs and nursing note reviewed.   Constitutional:       Appearance: She is well-developed.   Musculoskeletal:      Right foot: Tenderness present.      Left foot: Tenderness present.   Skin:     General: Skin is warm and dry.   Neurological:      Mental Status: She is alert and oriented to person, place, and time.   Psychiatric:         Behavior: Behavior normal.         Assessment/Plan   Diagnoses and all orders for this visit:    1. Plantar fasciitis, bilateral (Primary)  -     diclofenac (VOLTAREN) 75 MG EC tablet; Take 1 tablet by mouth 2 (Two) Times a Day.  Dispense: 60 tablet; Refill: 0    2. Flu vaccine need  -     Fluarix Quad >6 Months ([id number])    demonstrated stretches, showed calf stretches and night splint  Will start nsaid;  Consider steroid injection  D/w footwear and inserts  Consider podiatry referral         -Follow up: Prn - RTC if worse or no improvement.    "

## 2020-12-19 DIAGNOSIS — F41.8 DEPRESSION WITH ANXIETY: ICD-10-CM

## 2020-12-21 RX ORDER — FLUOXETINE HYDROCHLORIDE 40 MG/1
CAPSULE ORAL
Qty: 90 CAPSULE | Refills: 1 | Status: SHIPPED | OUTPATIENT
Start: 2020-12-21 | End: 2021-06-22

## 2021-01-04 DIAGNOSIS — F41.8 DEPRESSION WITH ANXIETY: Primary | ICD-10-CM

## 2021-01-04 RX ORDER — BUSPIRONE HYDROCHLORIDE 7.5 MG/1
7.5 TABLET ORAL 2 TIMES DAILY
Qty: 60 TABLET | Refills: 5 | Status: SHIPPED | OUTPATIENT
Start: 2021-01-04 | End: 2021-09-07

## 2021-04-02 ENCOUNTER — BULK ORDERING (OUTPATIENT)
Dept: CASE MANAGEMENT | Facility: OTHER | Age: 51
End: 2021-04-02

## 2021-04-02 DIAGNOSIS — Z23 IMMUNIZATION DUE: ICD-10-CM

## 2021-05-05 ENCOUNTER — TELEPHONE (OUTPATIENT)
Dept: FAMILY MEDICINE CLINIC | Facility: CLINIC | Age: 51
End: 2021-05-05

## 2021-05-05 NOTE — TELEPHONE ENCOUNTER
PATIENT CALLED STATING THAT SHE IS CURRENTLY TAKING FLUoxetine (PROzac) 40 MG capsule AND busPIRone (BUSPAR) 7.5 MG tablet. SHE IS CURRENTLY HAVING TO SWITCH MEDICATION THAT IS TREATING HER CANCER TO SUTENT 50MG, AND WAS ADVISED BY A PHARMACIST TO REACH OUT TO HER PCP AND CHECK THE EFFECTS OF THESE MEDICATIONS INTERACTING WITH EACH OTHER.    PLEASE ADVISE  [phone]

## 2021-05-06 NOTE — TELEPHONE ENCOUNTER
I ran through [employer] drug database interaction check and none reported with her new drug sutent.  SAIMAJ

## 2021-05-12 ENCOUNTER — TELEPHONE (OUTPATIENT)
Dept: FAMILY MEDICINE CLINIC | Facility: CLINIC | Age: 51
End: 2021-05-12

## 2021-05-12 DIAGNOSIS — R39.9 UTI SYMPTOMS: Primary | ICD-10-CM

## 2021-05-12 RX ORDER — CEPHALEXIN 500 MG/1
500 CAPSULE ORAL 2 TIMES DAILY
Qty: 14 CAPSULE | Refills: 0 | Status: SHIPPED | OUTPATIENT
Start: 2021-05-12 | End: 2021-07-12

## 2021-05-12 NOTE — TELEPHONE ENCOUNTER
Caller: Juanita Burden    Relationship: Self    Best call back number: [phone]    What medication are you requesting: ANY    What are your current symptoms: PAINFUL URINATION AND LOWER ABDOMINAL PAIN    How long have you been experiencing symptoms: LAST WEDNESDAY    Have you had these symptoms before:    [x] Yes  [] No    Have you been treated for these symptoms before:   [x] Yes  [] No    If a prescription is needed, what is your preferred pharmacy and phone number:      [facility] [address] [address] [place] - [phone]  - [phone]   [phone]    Additional notes:

## 2021-06-22 DIAGNOSIS — F41.8 DEPRESSION WITH ANXIETY: ICD-10-CM

## 2021-06-22 RX ORDER — FLUOXETINE HYDROCHLORIDE 40 MG/1
CAPSULE ORAL
Qty: 90 CAPSULE | Refills: 1 | Status: SHIPPED | OUTPATIENT
Start: 2021-06-22 | End: 2022-01-01 | Stop reason: SDUPTHER

## 2021-06-25 ENCOUNTER — TELEPHONE (OUTPATIENT)
Dept: FAMILY MEDICINE CLINIC | Facility: CLINIC | Age: 51
End: 2021-06-25

## 2021-07-12 ENCOUNTER — OFFICE VISIT (OUTPATIENT)
Dept: FAMILY MEDICINE CLINIC | Facility: CLINIC | Age: 51
End: 2021-07-12

## 2021-07-12 VITALS
WEIGHT: 190 LBS | DIASTOLIC BLOOD PRESSURE: 78 MMHG | HEIGHT: 62 IN | OXYGEN SATURATION: 98 % | HEART RATE: 79 BPM | RESPIRATION RATE: 18 BRPM | BODY MASS INDEX: 34.96 KG/M2 | SYSTOLIC BLOOD PRESSURE: 116 MMHG

## 2021-07-12 DIAGNOSIS — M54.2 CERVICALGIA: Primary | ICD-10-CM

## 2021-07-12 PROBLEM — R06.83 SNORING: Status: ACTIVE | Noted: 2021-07-12

## 2021-07-12 PROBLEM — I82.819 SUPERFICIAL THROMBOSIS OF LOWER EXTREMITY: Status: ACTIVE | Noted: 2021-07-12

## 2021-07-12 PROBLEM — F41.8 MIXED ANXIETY DEPRESSIVE DISORDER: Status: ACTIVE | Noted: 2021-07-12

## 2021-07-12 PROBLEM — C79.9 METASTATIC MALIGNANT NEOPLASM: Status: ACTIVE | Noted: 2021-07-12

## 2021-07-12 PROBLEM — U07.1 DISEASE DUE TO SEVERE ACUTE RESPIRATORY SYNDROME CORONAVIRUS 2 (SARS-COV-2): Status: ACTIVE | Noted: 2021-07-12

## 2021-07-12 PROBLEM — K21.9 GASTROESOPHAGEAL REFLUX DISEASE: Status: ACTIVE | Noted: 2021-07-12

## 2021-07-12 PROCEDURE — 99213 OFFICE O/P EST LOW 20 MIN: CPT | Performed by: FAMILY MEDICINE

## 2021-07-12 RX ORDER — SUNITINIB MALATE 50 MG/1
CAPSULE ORAL
COMMUNITY
Start: 2021-06-02 | End: 2021-08-05 | Stop reason: ALTCHOICE

## 2021-07-12 RX ORDER — DEXAMETHASONE 4 MG/1
4 TABLET ORAL 2 TIMES DAILY WITH MEALS
Qty: 14 TABLET | Refills: 0 | Status: SHIPPED | OUTPATIENT
Start: 2021-07-12 | End: 2021-08-05

## 2021-07-12 RX ORDER — PANTOPRAZOLE SODIUM 40 MG/1
40 TABLET, DELAYED RELEASE ORAL 2 TIMES DAILY
COMMUNITY
Start: 2021-06-10

## 2021-07-12 RX ORDER — DIPHENOXYLATE HYDROCHLORIDE AND ATROPINE SULFATE 2.5; .025 MG/1; MG/1
TABLET ORAL
COMMUNITY
Start: 2021-04-14 | End: 2021-10-05

## 2021-07-12 NOTE — PROGRESS NOTES
Subjective   Juanita Burden is a 50 y.o. female. Presents today for   Chief Complaint   Patient presents with   • Neck Pain   • Depression     f/u       Neck Pain   Chronicity: ON Chemo and started after this. The problem occurs constantly. The problem has been waxing and waning. The pain is associated with nothing. The pain is present in the right side and left side (Left > right). The quality of the pain is described as cramping and shooting. The pain is moderate. Associated symptoms comments: Reports 2 weeks off chemo didn't notice any improvement.   Left arm occly tingling, o/w no.    Though chemo as rest of muscle hurt, but legs better off chemo;  Has f/u next with oncology.. She has tried NSAIDs for the symptoms. The treatment provided no relief.   Depression  Visit Type: follow-up ( not doing well and feels main issue now not cancer;  would llike keep same med for now.)  Patient presents with the following symptoms: depressed mood.    having some hives off/on, on new chemo    Review of Systems   Musculoskeletal: Positive for neck pain.       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, malignant (CMS/HCC)   • Tachycardia   • Anemia   • Anxiety   • Celiac disease   • Malignant neoplasm of gastrointestinal tract (CMS/HCC)   • Disease due to severe acute respiratory syndrome coronavirus 2 (SARS-CoV-2)   • Gastroesophageal reflux disease   • Metastatic malignant neoplasm (CMS/HCC)   • Mixed anxiety depressive disorder   • Snoring   • Superficial thrombosis of lower extremity       Social History     Socioeconomic History   • Marital status:      Spouse name: Not on file   • Number of children: Not on file   • Years of education: Not on file   • Highest education level: Not on file   Tobacco Use   • Smoking status: Never Smoker   • Smokeless tobacco: Never Used   Vaping Use   • Vaping Use: Never used   Substance and  "Sexual Activity   • Alcohol use: No     Comment: rare   • Drug use: No       No Known Allergies    Current Outpatient Medications on File Prior to Visit   Medication Sig Dispense Refill   • busPIRone (BUSPAR) 7.5 MG tablet Take 1 tablet by mouth 2 (Two) Times a Day. 60 tablet 5   • diclofenac (VOLTAREN) 75 MG EC tablet Take 1 tablet by mouth 2 (Two) Times a Day. 60 tablet 0   • diphenoxylate-atropine (LOMOTIL) 2.5-0.025 MG per tablet TAKE 1 TABLET BY MOUTH THREE TIMES A DAY     • FLUoxetine (PROzac) 40 MG capsule TAKE 1 CAPSULE BY MOUTH EVERY DAY 90 capsule 1   • folic acid (FOLVITE) 1 MG tablet Take 1,000 mcg by mouth Daily.  5   • norethindrone (AYGESTIN) 5 MG tablet Take 5 mg by mouth Daily.  11   • pantoprazole (PROTONIX) 40 MG EC tablet      • Sutent 50 MG chemo capsule      • cephalexin (KEFLEX) 500 MG capsule Take 1 capsule by mouth 2 (Two) Times a Day. 14 capsule 0   • hydrOXYzine (ATARAX) 25 MG tablet Take 1 tablet by mouth 2 (Two) Times a Day As Needed.     • imatinib (GLEEVEC) 400 MG chemo tablet      • metoprolol succinate XL (TOPROL-XL) 50 MG 24 hr tablet Take 50 mg by mouth Daily.       No current facility-administered medications on file prior to visit.       Objective   Vitals:    07/12/21 1227   BP: 116/78   Pulse: 79   Resp: 18   SpO2: 98%   Weight: 86.2 kg (190 lb)   Height: 157.5 cm (62\")     Body mass index is 34.75 kg/m².    Physical Exam  Vitals and nursing note reviewed.   Constitutional:       Appearance: She is well-developed.   Musculoskeletal:      Right hand: Normal strength. Normal strength of finger abduction, thumb/finger opposition and wrist extension.      Left hand: Normal strength. Normal strength of finger abduction, thumb/finger opposition and wrist extension.      Cervical back: Decreased range of motion.   Skin:     General: Skin is warm and dry.   Neurological:      Mental Status: She is alert and oriented to person, place, and time.      Deep Tendon Reflexes:      Reflex " Scores:       Tricep reflexes are 2+ on the right side and 2+ on the left side.       Bicep reflexes are 2+ on the right side and 2+ on the left side.       Brachioradialis reflexes are 2+ on the right side and 2+ on the left side.  Psychiatric:         Behavior: Behavior normal.         Assessment/Plan   Diagnoses and all orders for this visit:    1. Cervicalgia (Primary)  -     dexamethasone (DECADRON) 4 MG tablet; Take 1 tablet by mouth 2 (Two) Times a Day With Meals.  Dispense: 14 tablet; Refill: 0    let oncology know about rash;  Will try decadron for neck and rash/itching.  Depression stable for now; d /w  and will work him in to meet with as well         -Follow up: 3 months and prn

## 2021-08-05 ENCOUNTER — OFFICE VISIT (OUTPATIENT)
Dept: FAMILY MEDICINE CLINIC | Facility: CLINIC | Age: 51
End: 2021-08-05

## 2021-08-05 VITALS
BODY MASS INDEX: 32.94 KG/M2 | SYSTOLIC BLOOD PRESSURE: 100 MMHG | DIASTOLIC BLOOD PRESSURE: 66 MMHG | HEART RATE: 87 BPM | HEIGHT: 62 IN | OXYGEN SATURATION: 99 % | WEIGHT: 179 LBS

## 2021-08-05 DIAGNOSIS — Z00.00 WELLNESS EXAMINATION: Primary | ICD-10-CM

## 2021-08-05 DIAGNOSIS — K85.30 DRUG-INDUCED ACUTE PANCREATITIS WITHOUT INFECTION OR NECROSIS: ICD-10-CM

## 2021-08-05 DIAGNOSIS — R74.8 ELEVATED LIVER ENZYMES: ICD-10-CM

## 2021-08-05 PROCEDURE — 99396 PREV VISIT EST AGE 40-64: CPT | Performed by: FAMILY MEDICINE

## 2021-08-05 NOTE — PROGRESS NOTES
Subjective   Juanita Burden is a 50 y.o. female. Presents today for   Chief Complaint   Patient presents with   • Annual Exam     wellness   • hep c screening   • Abdominal Pain     [facility] Follow up       History of Present Illness  Patient here for wellness exam and hospital f/u;  She was waiting on  having procedure and developed severe abd pain.  Was found to have pancreatitis on CT, lipase 600 (see below).  Acute pancreatitis likely secondary to Sunitinib therapy  Stage 4 gastrointestinal stromal tumor of presumed small bowel origin s/p initially treated for two years with Imatinib and ultimately required resection of 7 cm peritoneal tumor, small bowel and colon in 1/2021.   Had bx x2 of liver lesions and benign;  Is going back to [facility].  Pain is better now;  Chest pain/soa none now.  No issues with eating;  Tryinig eat healthy (has celiac disease).      Review of Systems   Respiratory: Negative for shortness of breath.    Cardiovascular: Negative for chest pain and palpitations.   Gastrointestinal: Negative for abdominal pain, nausea and vomiting.       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, malignant (CMS/HCC)   • Tachycardia   • Anemia   • Anxiety   • Celiac disease   • Malignant neoplasm of gastrointestinal tract (CMS/HCC)   • Disease due to severe acute respiratory syndrome coronavirus 2 (SARS-CoV-2)   • Gastroesophageal reflux disease   • Metastatic malignant neoplasm (CMS/HCC)   • Mixed anxiety depressive disorder   • Snoring   • Superficial thrombosis of lower extremity       Social History     Socioeconomic History   • Marital status:      Spouse name: Not on file   • Number of children: Not on file   • Years of education: Not on file   • Highest education level: Not on file   Tobacco Use   • Smoking status: Never Smoker   • Smokeless tobacco: Never Used   Vaping Use   • Vaping Use: Never used  "  Substance and Sexual Activity   • Alcohol use: No     Comment: rare   • Drug use: No       No Known Allergies    Current Outpatient Medications on File Prior to Visit   Medication Sig Dispense Refill   • busPIRone (BUSPAR) 7.5 MG tablet Take 1 tablet by mouth 2 (Two) Times a Day. 60 tablet 5   • FLUoxetine (PROzac) 40 MG capsule TAKE 1 CAPSULE BY MOUTH EVERY DAY 90 capsule 1   • folic acid (FOLVITE) 1 MG tablet Take 1,000 mcg by mouth Daily.  5   • norethindrone (AYGESTIN) 5 MG tablet Take 5 mg by mouth Daily.  11   • pantoprazole (PROTONIX) 40 MG EC tablet Take 40 mg by mouth 2 (Two) Times a Day.     • diphenoxylate-atropine (LOMOTIL) 2.5-0.025 MG per tablet TAKE 1 TABLET BY MOUTH THREE TIMES A DAY     • [DISCONTINUED] dexamethasone (DECADRON) 4 MG tablet Take 1 tablet by mouth 2 (Two) Times a Day With Meals. 14 tablet 0   • [DISCONTINUED] diclofenac (VOLTAREN) 75 MG EC tablet Take 1 tablet by mouth 2 (Two) Times a Day. 60 tablet 0   • [DISCONTINUED] Sutent 50 MG chemo capsule        No current facility-administered medications on file prior to visit.       Objective   Vitals:    08/05/21 0837   BP: 100/66   Pulse: 87   SpO2: 99%   Weight: 81.2 kg (179 lb)   Height: 157.5 cm (62\")     Body mass index is 32.74 kg/m².    Physical Exam  Vitals and nursing note reviewed.   Constitutional:       Appearance: She is well-developed.   HENT:      Head: Normocephalic and atraumatic.   Neck:      Thyroid: No thyromegaly.      Vascular: No JVD.   Cardiovascular:      Rate and Rhythm: Normal rate and regular rhythm.      Heart sounds: Normal heart sounds. No murmur heard.   No friction rub. No gallop.    Pulmonary:      Effort: Pulmonary effort is normal. No respiratory distress.      Breath sounds: Normal breath sounds. No wheezing or rales.   Abdominal:      General: Bowel sounds are normal. There is no distension.      Palpations: Abdomen is soft.      Tenderness: There is no abdominal tenderness. There is no guarding or " "rebound.   Musculoskeletal:      Cervical back: Neck supple.   Skin:     General: Skin is warm and dry.   Neurological:      Mental Status: She is alert.   Psychiatric:         Behavior: Behavior normal.                                       7/23/2021 11:02 EDT - Auth (Verified)   SPECIMEN:   A) Liver lesion fine needle aspiration       CLINICAL HISTORY:   Pancreatitis, stage 4 GIST fe   Pre-Operative Diagnosis:1 cm liver lesion     SPECIMEN ADEQUACY:   Satisfactory for evaluation     CYTOLOGIC DIAGNOSIS:   A) Liver lesion fine needle aspiration ( 2 smears and a cell block):   -  Small fragment of benign hepatic  parenchyma . See comment     Electronically signed by SOCRATES SMALLS MD-PAT   Verified: 07/23/21     COMMENT:   The findings may not explain the clinically described mass, clinical and radiologic correlation    is recommended.       GROSS DESCRIPTION:   2 smears (1 diff quik, 1 pap stain) and a cell block (2 H&E, 4 unstained)   F\"F/TDW/TDW      INDICATION:   50-year-old abdominal pain. History of small bowel obstruction, anemia, GI cancer,   celiac disease..     TECHNIQUE:   CT of the abdomen and pelvis with contrast. Coronal and sagittal reconstructions were   obtained.  Radiation dose reduction techniques included automated exposure control or   exposure modulation based on body size.     COMPARISON:   10/18/2018: 13 cm mass left upper quadrant surrounding small bowel, hypodense liver   lesions measuring up to 2.7 cm on the left upper quadrant adenopathy.     FINDINGS:   LUNG BASES bibasilar atelectasis and/or scarring. The heart size is within normal   limits.     ABDOMEN:     Liver: Hepatic steatosis. Multiple new indeterminate hypodense liver lesions the   largest measuring up to 1.5 cm. The previously described 2.7 cm hypodense mass   concerning for metastasis in the liver now measures 1.3 cm and is lower in   attenuation.   Pancreatico biliary:   Distended gallbladder with diffuse wall thickening and " edema. Mild central biliary   ductal dilatation with the common bile duct measuring up to 6 mm tapering distally. No   definitive radiopaque filling defects.     Extensive inflammatory changes centered about the pancreas which is edematous   extending along the anterior pararenal space, the pancreaticoduodenal groove in the   gastrohepatic ligament. No focal or drainable fluid collections. There is a homogenous   enhancement of the pancreas. No ductal dilatation.     Spleen: Unremarkable.         Adrenal glands: Unremarkable.     Kidneys: No hydronephrosis. 2 small to characterize hypodensities     GASTROINTESTINAL TRACT: Small hiatal hernia. Apparent thickening along the distal   stomach and proximal duodenum. Postsurgical changes of small bowel resection with   reanastomosis in the left upper quadrant. No obstruction. The colon is stool-filled.   Please ensure up-to-date with screening colonoscopy. No new dominant masses however,   recommend comparison with any outside recent exams and continued active surveillance   given history. Evaluation is somewhat obscured by peripancreatic edema/fluid. Please   correlate with prior pathology report and follow-up given history of GI malignancy.     The presumed appendix is within normal limits in the right lower quadrant anteriorly   (axial image 57). No free intraperitoneal air.       PELVIS:           Pelvic viscera: Heterogeneous bulky uterus. The bladder is incompletely   distended. Trace free pelvic fluid.     VASCULAR: Normal vascular enhancement centrally. Scattered calcific atherosclerotic   change.     LYMPHATICS: Resection of the previously described 13 cm mass in the left upper lobe   quadrant. Scattered subcentimeter abdominal peritoneal nodes within the index   gastrohepatic node measuring 1.5 cm. BONE WINDOWS: Multilevel degenerative changes.     ADDITIONAL COMMENTS: None.     IMPRESSION:   1. Findings suggesting acute interstitial pancreatitis (please  correlate with   laboratory values) with peripancreatic inflammatory changes. Hydropic edematous   gallbladder with central biliary ductal dilatation favors the sequela of pancreatitis   versus primary cholecystitis. HIDA scan could be considered for further evaluation.   2. Status post resection of the mass in the left upper quadrant suggestive of   lymphoma. No obstruction. There are few mildly prominent abdominal peritoneal nodes   for which attention on short-term follow-up is recommended given history. Please   ensure up-to-date with endoscopy.     3. Multiple new indeterminate hypodense liver lesions. Given history of prior   malignancy recommend nonemergent follow-up with MRI.   4. Please see above for additional findings.     These findings were discussed with Dr. Haro by myself on 7/20/2020 1:12 PM.   Component 07/20/2021 07/20/2021 07/20/2021 01/11/2021 01/09/2021 01/09/2021 01/09/2021 01/09/2021 01/08/2021 01/08/2021 01/08/2021 01/08/2021 01/06/2021 01/06/2021                    Sodium 133Low     -- -- 135 136 -- -- -- 137 -- -- -- 138 --   Potassium 3.7 -- -- 3.1Low     3.5 -- -- -- 3.7 -- -- -- 3.6 --   Chloride 102 -- -- 106 104 -- -- -- 106 -- -- -- 108 --   CO2 22 -- -- 23.0 25.0 -- -- -- 22.0 -- -- -- 20.0Low     --   Anion Gap 9 -- -- 6.0 7.0 -- -- -- 9.0 -- -- -- 10.0 --   Calcium 8.9 -- -- 8.5 8.5 -- -- -- 8.5 -- -- -- 9.1 --   Glucose 153High     -- -- 116High     113High     -- -- -- 107 -- -- -- 116High     --   BUN 29High     -- -- 9 9 -- -- -- 12 -- -- -- 13 --   Creatinine 0.8 -- -- 0.81 0.89 -- -- -- 0.83 -- -- -- 1.07High     --   BUN/Creatinine Ratio 36.2 -- -- 11.1 10.1 -- -- -- 14.5 -- -- -- 12.1 --   Albumin 3.9 -- -- -- 3.4 -- -- -- 3.4 -- -- -- 4.4 --   Total Protein 6.5 -- -- -- 6.5 -- -- -- 6.2Low     -- -- -- 7.4 --   A/G Ratio 1.50 -- -- -- 1.1 -- -- -- 1.2 -- -- -- 1.5 --   Alkaline Phosphatase 44 -- -- -- 55 -- -- -- 42 -- -- -- 45 --   ALT (SGPT) 74High     -- -- -- 77High      -- -- -- 64High     -- -- -- 21 --   AST 89High     -- -- -- 71High     -- -- -- 77High     -- -- -- 25 --   Total Bilirubin 0.9 -- -- -- 1.5High     -- -- -- 2.3High     -- -- -- 0.8 --   eGFR 92.4 76.2 -- -- 82 67 -- -- 89 73 -- -- 66 54Low       Magnesium -- -- -- -- -- -- 1.9 -- -- -- 2.3 -- -- --   Phosphorus -- -- -- -- -- -- -- 1.6Low     -- -- -- 1.6Low     -- --   Lipase -- -- >600High                      Assessment/Plan   Diagnoses and all orders for this visit:    1. Wellness examination (Primary)    2. Drug-induced acute pancreatitis without infection or necrosis    3. Elevated liver enzymes    Counseled on diet  Continue push fluids  Had labs with Oncology, will call for results.           -Follow up:  6 months and prn

## 2021-08-05 NOTE — PATIENT INSTRUCTIONS
Calorie Counting for Weight Loss  Calories are units of energy. Your body needs a certain number of calories from food to keep going throughout the day. When you eat or drink more calories than your body needs, your body stores the extra calories mostly as fat. When you eat or drink fewer calories than your body needs, your body burns fat to get the energy it needs.  Calorie counting means keeping track of how many calories you eat and drink each day. Calorie counting can be helpful if you need to lose weight. If you eat fewer calories than your body needs, you should lose weight. Ask your health care provider what a healthy weight is for you.  For calorie counting to work, you will need to eat the right number of calories each day to lose a healthy amount of weight per week. A dietitian can help you figure out how many calories you need in a day and will suggest ways to reach your calorie goal.  · A healthy amount of weight to lose each week is usually 1-2 lb (0.5-0.9 kg). This usually means that your daily calorie intake should be reduced by 500-750 calories.  · Eating 1,200-1,500 calories a day can help most women lose weight.  · Eating 1,500-1,800 calories a day can help most men lose weight.  What do I need to know about calorie counting?  Work with your health care provider or dietitian to determine how many calories you should get each day. To meet your daily calorie goal, you will need to:  · Find out how many calories are in each food that you would like to eat. Try to do this before you eat.  · Decide how much of the food you plan to eat.  · Keep a food log. Do this by writing down what you ate and how many calories it had.  To successfully lose weight, it is important to balance calorie counting with a healthy lifestyle that includes regular activity.  Where do I find calorie information?    The number of calories in a food can be found on a Nutrition Facts label. If a food does not have a Nutrition Facts  label, try to look up the calories online or ask your dietitian for help.  Remember that calories are listed per serving. If you choose to have more than one serving of a food, you will have to multiply the calories per serving by the number of servings you plan to eat. For example, the label on a package of bread might say that a serving size is 1 slice and that there are 90 calories in a serving. If you eat 1 slice, you will have eaten 90 calories. If you eat 2 slices, you will have eaten 180 calories.  How do I keep a food log?  After each time that you eat, record the following in your food log as soon as possible:  · What you ate. Be sure to include toppings, sauces, and other extras on the food.  · How much you ate. This can be measured in cups, ounces, or number of items.  · How many calories were in each food and drink.  · The total number of calories in the food you ate.  Keep your food log near you, such as in a pocket-sized notebook or on an adilson or website on your mobile phone. Some programs will calculate calories for you and show you how many calories you have left to meet your daily goal.  What are some portion-control tips?  · Know how many calories are in a serving. This will help you know how many servings you can have of a certain food.  · Use a measuring cup to measure serving sizes. You could also try weighing out portions on a kitchen scale. With time, you will be able to estimate serving sizes for some foods.  · Take time to put servings of different foods on your favorite plates or in your favorite bowls and cups so you know what a serving looks like.  · Try not to eat straight from a food's packaging, such as from a bag or box. Eating straight from the package makes it hard to see how much you are eating and can lead to overeating. Put the amount you would like to eat in a cup or on a plate to make sure you are eating the right portion.  · Use smaller plates, glasses, and bowls for smaller  portions and to prevent overeating.  · Try not to multitask. For example, avoid watching TV or using your computer while eating. If it is time to eat, sit down at a table and enjoy your food. This will help you recognize when you are full. It will also help you be more mindful of what and how much you are eating.  What are tips for following this plan?  Reading food labels  · Check the calorie count compared with the serving size. The serving size may be smaller than what you are used to eating.  · Check the source of the calories. Try to choose foods that are high in protein, fiber, and vitamins, and low in saturated fat, trans fat, and sodium.  Shopping  · Read nutrition labels while you shop. This will help you make healthy decisions about which foods to buy.  · Pay attention to nutrition labels for low-fat or fat-free foods. These foods sometimes have the same number of calories or more calories than the full-fat versions. They also often have added sugar, starch, or salt to make up for flavor that was removed with the fat.  · Make a grocery list of lower-calorie foods and stick to it.  Cooking  · Try to cook your favorite foods in a healthier way. For example, try baking instead of frying.  · Use low-fat dairy products.  Meal planning  · Use more fruits and vegetables. One-half of your plate should be fruits and vegetables.  · Include lean proteins, such as chicken, turkey, and fish.  Lifestyle  Each week, aim to do one of the following:  · 150 minutes of moderate exercise, such as walking.  · 75 minutes of vigorous exercise, such as running.  General information  · Know how many calories are in the foods you eat most often. This will help you calculate calorie counts faster.  · Find a way of tracking calories that works for you. Get creative. Try different apps or programs if writing down calories does not work for you.  What foods should I eat?    · Eat nutritious foods. It is better to have a nutritious,  high-calorie food, such as an avocado, than a food with few nutrients, such as a bag of potato chips.  · Use your calories on foods and drinks that will fill you up and will not leave you hungry soon after eating.  ? Examples of foods that fill you up are nuts and nut butters, vegetables, lean proteins, and high-fiber foods such as whole grains. High-fiber foods are foods with more than 5 g of fiber per serving.  · Pay attention to calories in drinks. Low-calorie drinks include water and unsweetened drinks.  The items listed above may not be a complete list of foods and beverages you can eat. Contact a dietitian for more information.  What foods should I limit?  Limit foods or drinks that are not good sources of vitamins, minerals, or protein or that are high in unhealthy fats. These include:  · Candy.  · Other sweets.  · Sodas, specialty coffee drinks, alcohol, and juice.  The items listed above may not be a complete list of foods and beverages you should avoid. Contact a dietitian for more information.  How do I count calories when eating out?  · Pay attention to portions. Often, portions are much larger when eating out. Try these tips to keep portions smaller:  ? Consider sharing a meal instead of getting your own.  ? If you get your own meal, eat only half of it. Before you start eating, ask for a container and put half of your meal into it.  ? When available, consider ordering smaller portions from the menu instead of full portions.  · Pay attention to your food and drink choices. Knowing the way food is cooked and what is included with the meal can help you eat fewer calories.  ? If calories are listed on the menu, choose the lower-calorie options.  ? Choose dishes that include vegetables, fruits, whole grains, low-fat dairy products, and lean proteins.  ? Choose items that are boiled, broiled, grilled, or steamed. Avoid items that are buttered, battered, fried, or served with cream sauce. Items labeled as  crispy are usually fried, unless stated otherwise.  ? Choose water, low-fat milk, unsweetened iced tea, or other drinks without added sugar. If you want an alcoholic beverage, choose a lower-calorie option, such as a glass of wine or light beer.  ? Ask for dressings, sauces, and syrups on the side. These are usually high in calories, so you should limit the amount you eat.  ? If you want a salad, choose a garden salad and ask for grilled meats. Avoid extra toppings such as riley, cheese, or fried items. Ask for the dressing on the side, or ask for olive oil and vinegar or lemon to use as dressing.  · Estimate how many servings of a food you are given. Knowing serving sizes will help you be aware of how much food you are eating at restaurants.  Where to find more information  · Centers for Disease Control and Prevention: www.cdc.gov  · [place] Department of Agriculture: myplate.gov  Summary  · Calorie counting means keeping track of how many calories you eat and drink each day. If you eat fewer calories than your body needs, you should lose weight.  · A healthy amount of weight to lose per week is usually 1-2 lb (0.5-0.9 kg). This usually means reducing your daily calorie intake by 500-750 calories.  · The number of calories in a food can be found on a Nutrition Facts label. If a food does not have a Nutrition Facts label, try to look up the calories online or ask your dietitian for help.  · Use smaller plates, glasses, and bowls for smaller portions and to prevent overeating.  · Use your calories on foods and drinks that will fill you up and not leave you hungry shortly after a meal.  This information is not intended to replace advice given to you by your health care provider. Make sure you discuss any questions you have with your health care provider.  Document Revised: 01/28/2021 Document Reviewed: 01/28/2021  Elsevier Patient Education © 2021 [employer]

## 2021-08-19 ENCOUNTER — TELEPHONE (OUTPATIENT)
Dept: FAMILY MEDICINE CLINIC | Facility: CLINIC | Age: 51
End: 2021-08-19

## 2021-08-19 NOTE — TELEPHONE ENCOUNTER
Just heard covid 19 running through family and 1 daughter +.  Divide she had negative rapid test.   I would highlyr ecommend go ahead and retest as high risk and candidate for antibody infusion which can prevent worsening and hospitalization.  Can come for nurse visit to test, but do so right away or can go to [facility] as they have rapid.  They will only let me order antibody outpatient if has + test.

## 2021-08-19 NOTE — TELEPHONE ENCOUNTER
Ok if no symptoms, then good sign.  Let know if develop any and let know results of test right away.  RRJ

## 2021-08-19 NOTE — TELEPHONE ENCOUNTER
Pt is going to go get tested today. Per pt, she has not had any symptoms on her daughter and grand-daughter which live with her.

## 2021-09-04 DIAGNOSIS — F41.8 DEPRESSION WITH ANXIETY: ICD-10-CM

## 2021-09-07 RX ORDER — BUSPIRONE HYDROCHLORIDE 7.5 MG/1
TABLET ORAL
Qty: 60 TABLET | Refills: 11 | Status: SHIPPED | OUTPATIENT
Start: 2021-09-07 | End: 2021-12-03 | Stop reason: SDUPTHER

## 2021-09-09 RX ORDER — FLUOXETINE HYDROCHLORIDE 20 MG/1
CAPSULE ORAL
Qty: 30 CAPSULE | Refills: 5 | Status: SHIPPED | OUTPATIENT
Start: 2021-09-09 | End: 2022-01-01

## 2021-10-05 ENCOUNTER — OFFICE VISIT (OUTPATIENT)
Dept: FAMILY MEDICINE CLINIC | Facility: CLINIC | Age: 51
End: 2021-10-05

## 2021-10-05 VITALS
WEIGHT: 185 LBS | HEART RATE: 89 BPM | SYSTOLIC BLOOD PRESSURE: 104 MMHG | DIASTOLIC BLOOD PRESSURE: 68 MMHG | BODY MASS INDEX: 34.04 KG/M2 | OXYGEN SATURATION: 100 % | HEIGHT: 62 IN

## 2021-10-05 DIAGNOSIS — K85.00 IDIOPATHIC ACUTE PANCREATITIS WITHOUT INFECTION OR NECROSIS: Primary | ICD-10-CM

## 2021-10-05 DIAGNOSIS — M54.31 SCIATICA OF RIGHT SIDE: ICD-10-CM

## 2021-10-05 DIAGNOSIS — C26.9: ICD-10-CM

## 2021-10-05 DIAGNOSIS — Z90.49 HISTORY OF LAPAROSCOPIC CHOLECYSTECTOMY: ICD-10-CM

## 2021-10-05 PROCEDURE — 96372 THER/PROPH/DIAG INJ SC/IM: CPT | Performed by: FAMILY MEDICINE

## 2021-10-05 PROCEDURE — 99214 OFFICE O/P EST MOD 30 MIN: CPT | Performed by: FAMILY MEDICINE

## 2021-10-05 RX ORDER — TRIAMCINOLONE ACETONIDE 40 MG/ML
40 INJECTION, SUSPENSION INTRA-ARTICULAR; INTRAMUSCULAR ONCE
Status: COMPLETED | OUTPATIENT
Start: 2021-10-05 | End: 2021-10-05

## 2021-10-05 RX ORDER — LORATADINE 10 MG/1
10 TABLET ORAL DAILY
COMMUNITY
Start: 2021-09-17

## 2021-10-05 RX ORDER — POLYETHYLENE GLYCOL 3350 17 G/17G
17 POWDER, FOR SOLUTION ORAL
COMMUNITY
Start: 2021-09-17 | End: 2021-10-05

## 2021-10-05 RX ADMIN — TRIAMCINOLONE ACETONIDE 40 MG: 40 INJECTION, SUSPENSION INTRA-ARTICULAR; INTRAMUSCULAR at 10:47

## 2021-10-05 NOTE — PROGRESS NOTES
Subjective   Juanita Burden is a 50 y.o. female. Presents today for   Chief Complaint   Patient presents with   • Hospital Follow Up Visit       Post-op 2 week follow up, Cholecystectomy, IO Cholaangiogram, liver/omental biopsy, path. 9/14/21     Patient 51y/o with GIST admitted [facility], had pancreatitis thought secondary to chemo, but had to be admitted for pancreatic stents.  Still issues, then had lap choley;  Reports got out of hospital and Mom passed away suddenly.  Saw PA for general surgery yesterday post-op.    Abdominal Pain  This is a recurrent problem. The onset quality is gradual. The problem occurs constantly. The problem has been waxing and waning. The pain is located in the epigastric region. The quality of the pain is aching and burning. The abdominal pain radiates to the back. Associated symptoms include nausea. Pertinent negatives include no hematochezia, melena or vomiting. The pain is relieved by nothing. Treatments tried: s/p stent and choley;  though still pain; not as bad as last July. Prior diagnostic workup includes CT scan, lower endoscopy, surgery, upper endoscopy, GI consult and ultrasound. Her past medical history is significant for gallstones and pancreatitis.   Back Pain  This is a recurrent problem. The current episode started 1 to 4 weeks ago. The problem occurs intermittently. The problem has been waxing and waning since onset. The pain is present in the lumbar spine. The pain radiates to the right thigh. The pain is moderate. Associated symptoms include abdominal pain. She has tried nothing for the symptoms. The treatment provided no relief.       Review of Systems   Gastrointestinal: Positive for abdominal pain and nausea. Negative for hematochezia, melena and vomiting.   Musculoskeletal: Positive for back pain.       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, malignant (HCC)   • Tachycardia   • Anemia  "  • Anxiety   • Celiac disease   • Malignant neoplasm of gastrointestinal tract (HCC)   • Disease due to severe acute respiratory syndrome coronavirus 2 (SARS-CoV-2)   • Gastroesophageal reflux disease   • Metastatic malignant neoplasm (HCC)   • Mixed anxiety depressive disorder   • Snoring   • Superficial thrombosis of lower extremity       Social History     Socioeconomic History   • Marital status:      Spouse name: Not on file   • Number of children: Not on file   • Years of education: Not on file   • Highest education level: Not on file   Tobacco Use   • Smoking status: Never Smoker   • Smokeless tobacco: Never Used   Vaping Use   • Vaping Use: Never used   Substance and Sexual Activity   • Alcohol use: No     Comment: rare   • Drug use: No       No Known Allergies    Current Outpatient Medications on File Prior to Visit   Medication Sig Dispense Refill   • busPIRone (BUSPAR) 7.5 MG tablet TAKE ONE TABLET BY MOUTH TWICE A DAY 60 tablet 11   • FLUoxetine (PROzac) 20 MG capsule Take 1 20mg cap daily with 40mg capsule of fluoxetine 30 capsule 5   • FLUoxetine (PROzac) 40 MG capsule TAKE 1 CAPSULE BY MOUTH EVERY DAY 90 capsule 1   • folic acid (FOLVITE) 1 MG tablet Take 1,000 mcg by mouth Daily.  5   • loratadine (CLARITIN) 10 MG tablet Take 10 mg by mouth Daily.     • norethindrone (AYGESTIN) 5 MG tablet Take 5 mg by mouth Daily.  11   • pantoprazole (PROTONIX) 40 MG EC tablet Take 40 mg by mouth 2 (Two) Times a Day.     • [DISCONTINUED] polyethylene glycol (MiraLax) 17 g packet 17 g.     • [DISCONTINUED] diphenoxylate-atropine (LOMOTIL) 2.5-0.025 MG per tablet TAKE 1 TABLET BY MOUTH THREE TIMES A DAY       No current facility-administered medications on file prior to visit.       Objective   Vitals:    10/05/21 0955   BP: 104/68   Pulse: 89   SpO2: 100%   Weight: 83.9 kg (185 lb)   Height: 157.5 cm (62\")   PainSc:   4   PainLoc: Abdomen     Body mass index is 33.84 kg/m².    Physical Exam  Vitals and " nursing note reviewed.   Constitutional:       Appearance: She is well-developed.   HENT:      Head: Normocephalic and atraumatic.   Neck:      Thyroid: No thyromegaly.      Vascular: No JVD.   Cardiovascular:      Rate and Rhythm: Normal rate and regular rhythm.      Heart sounds: Normal heart sounds. No murmur heard.   No friction rub. No gallop.    Pulmonary:      Effort: Pulmonary effort is normal. No respiratory distress.      Breath sounds: Normal breath sounds. No wheezing or rales.   Abdominal:      General: Bowel sounds are normal. There is no distension.      Palpations: Abdomen is soft.      Tenderness: There is no abdominal tenderness. There is no guarding or rebound.   Musculoskeletal:      Cervical back: Neck supple.   Skin:     General: Skin is warm and dry.   Neurological:      Mental Status: She is alert.   Psychiatric:         Behavior: Behavior normal.                              9/21/2021 08:30 EDT - Auth (Verified)   SPECIMEN:   (A)  Gallbladder.   (B)  Left lobe liver biopsy.   (C)  Peritoneal biopsy.       CLINICAL HISTORY:   Pre-op diagnosis: Cholecystitis.     DIAGNOSIS:   (A)  Gallbladder, cholecystectomy:   - Acute and chronic cholecystitis     (B)  Liver, Left lobe, biopsy:   - Metastatic gastrointestinal stromal tumor, spindle cell type (2 mitosis/5mm    2)   - Positive for  and DOG by immunohistochemistry     (C)  Peritoneum, biopsy:   - Hypocellular spindle cell proliferation   - Pending: additional levels and correlation with immunostains   - An addendum will follow      Assessment/Plan   Diagnoses and all orders for this visit:    1. Idiopathic acute pancreatitis without infection or necrosis (Primary)  -     CBC & Differential  -     Comprehensive Metabolic Panel  -     Amylase  -     Lipase    2. Malignant neoplasm of gastrointestinal tract (HCC)  -     CBC & Differential  -     Comprehensive Metabolic Panel  -     Amylase  -     Lipase    3. History of laparoscopic  cholecystectomy    4. Sciatica of right side  -     triamcinolone acetonide (KENALOG-40) injection 40 mg      Recurrent pancreatitis - currently having pain, check labs and fax to   Has GIST - off chemo, but to start again Monday, following with oncology;  Doesn't think chemo caused pancreatitis, more likley GB so going back on it.   Back pain, will try steroid injection     Fax labs to [facility]  [phone];  ATTN:  RAVINDER Gonzalez    -Follow up: 3 months and prn

## 2021-10-07 LAB
ALBUMIN SERPL-MCNC: 4.9 G/DL (ref 3.5–5.2)
ALBUMIN/GLOB SERPL: 2.6 G/DL
ALP SERPL-CCNC: 48 U/L (ref 39–117)
ALT SERPL-CCNC: 22 U/L (ref 1–33)
AMYLASE SERPL-CCNC: 157 U/L (ref 28–100)
AST SERPL-CCNC: 16 U/L (ref 1–32)
BILIRUB SERPL-MCNC: 0.3 MG/DL (ref 0–1.2)
BUN SERPL-MCNC: 15 MG/DL (ref 6–20)
BUN/CREAT SERPL: 19 (ref 7–25)
CALCIUM SERPL-MCNC: 10.1 MG/DL (ref 8.6–10.5)
CHLORIDE SERPL-SCNC: 106 MMOL/L (ref 98–107)
CO2 SERPL-SCNC: 25.7 MMOL/L (ref 22–29)
CREAT SERPL-MCNC: 0.79 MG/DL (ref 0.57–1)
DIFFERENTIAL COMMENT: ABNORMAL
ERYTHROCYTE [DISTWIDTH] IN BLOOD BY AUTOMATED COUNT: 12 % (ref 12.3–15.4)
GLOBULIN SER CALC-MCNC: 1.9 GM/DL
GLUCOSE SERPL-MCNC: 117 MG/DL (ref 65–99)
HCT VFR BLD AUTO: 37.2 % (ref 34–46.6)
HGB BLD-MCNC: 12.1 G/DL (ref 12–15.9)
LIPASE SERPL-CCNC: 165 U/L (ref 13–60)
LYMPHOCYTES # BLD MANUAL: 0.88 10*3/MM3 (ref 0.7–3.1)
LYMPHOCYTES NFR BLD MANUAL: 15 % (ref 19.6–45.3)
MCH RBC QN AUTO: 34.6 PG (ref 26.6–33)
MCHC RBC AUTO-ENTMCNC: 32.5 G/DL (ref 31.5–35.7)
MCV RBC AUTO: 106.3 FL (ref 79–97)
MONOCYTES # BLD MANUAL: 0.35 10*3/MM3 (ref 0.1–0.9)
MONOCYTES NFR BLD MANUAL: 6 % (ref 5–12)
NEUTROPHILS # BLD MANUAL: 4.63 10*3/MM3 (ref 1.7–7)
NEUTROPHILS NFR BLD MANUAL: 79 % (ref 42.7–76)
NRBC BLD AUTO-RTO: 0.2 /100 WBC (ref 0–0.2)
PLATELET # BLD AUTO: 345 10*3/MM3 (ref 140–450)
PLATELET BLD QL SMEAR: ABNORMAL
POTASSIUM SERPL-SCNC: 5 MMOL/L (ref 3.5–5.2)
PROT SERPL-MCNC: 6.8 G/DL (ref 6–8.5)
RBC # BLD AUTO: 3.5 10*6/MM3 (ref 3.77–5.28)
RBC MORPH BLD: ABNORMAL
SODIUM SERPL-SCNC: 141 MMOL/L (ref 136–145)
WBC # BLD AUTO: 5.86 10*3/MM3 (ref 3.4–10.8)

## 2021-10-09 NOTE — PROGRESS NOTES
Call results to patient.  Blood counts stable  Kidney and liver function ok  Pancreatic enzymes mildly elevated  Fax copy to Oncology

## 2021-10-13 ENCOUNTER — TELEPHONE (OUTPATIENT)
Dept: FAMILY MEDICINE CLINIC | Facility: CLINIC | Age: 51
End: 2021-10-13

## 2021-10-13 NOTE — TELEPHONE ENCOUNTER
Faxed 10/13/21  
PATIENT IS CALLING IN SHE IS ASKING TO HAVE HER RECENT LAB RESULTS FAXED OVER TO  OF  [facility].      FAX NUMBER IS  [phone]        
patient

## 2021-12-03 DIAGNOSIS — F41.8 DEPRESSION WITH ANXIETY: ICD-10-CM

## 2021-12-03 RX ORDER — BUSPIRONE HYDROCHLORIDE 15 MG/1
15 TABLET ORAL 2 TIMES DAILY
Qty: 60 TABLET | Refills: 5 | Status: SHIPPED | OUTPATIENT
Start: 2021-12-03 | End: 2022-01-01

## 2022-01-01 ENCOUNTER — OFFICE VISIT (OUTPATIENT)
Dept: FAMILY MEDICINE CLINIC | Facility: CLINIC | Age: 52
End: 2022-01-01

## 2022-01-01 ENCOUNTER — TELEPHONE (OUTPATIENT)
Dept: FAMILY MEDICINE CLINIC | Facility: CLINIC | Age: 52
End: 2022-01-01

## 2022-01-01 VITALS
SYSTOLIC BLOOD PRESSURE: 110 MMHG | DIASTOLIC BLOOD PRESSURE: 84 MMHG | BODY MASS INDEX: 35.7 KG/M2 | HEIGHT: 62 IN | WEIGHT: 194 LBS | OXYGEN SATURATION: 98 % | HEART RATE: 88 BPM

## 2022-01-01 VITALS
HEART RATE: 87 BPM | OXYGEN SATURATION: 97 % | SYSTOLIC BLOOD PRESSURE: 108 MMHG | BODY MASS INDEX: 34.96 KG/M2 | WEIGHT: 190 LBS | HEIGHT: 62 IN | DIASTOLIC BLOOD PRESSURE: 68 MMHG

## 2022-01-01 VITALS
WEIGHT: 194 LBS | SYSTOLIC BLOOD PRESSURE: 112 MMHG | DIASTOLIC BLOOD PRESSURE: 82 MMHG | OXYGEN SATURATION: 99 % | HEART RATE: 89 BPM | HEIGHT: 62 IN | BODY MASS INDEX: 35.7 KG/M2

## 2022-01-01 VITALS
WEIGHT: 196 LBS | OXYGEN SATURATION: 98 % | BODY MASS INDEX: 36.07 KG/M2 | HEIGHT: 62 IN | DIASTOLIC BLOOD PRESSURE: 84 MMHG | HEART RATE: 92 BPM | SYSTOLIC BLOOD PRESSURE: 110 MMHG

## 2022-01-01 DIAGNOSIS — E55.9 VITAMIN D DEFICIENCY: ICD-10-CM

## 2022-01-01 DIAGNOSIS — E03.9 ACQUIRED HYPOTHYROIDISM: Primary | ICD-10-CM

## 2022-01-01 DIAGNOSIS — F41.8 DEPRESSION WITH ANXIETY: ICD-10-CM

## 2022-01-01 DIAGNOSIS — D75.89 MACROCYTOSIS: ICD-10-CM

## 2022-01-01 DIAGNOSIS — E78.5 DYSLIPIDEMIA: ICD-10-CM

## 2022-01-01 DIAGNOSIS — Z23 FLU VACCINE NEED: ICD-10-CM

## 2022-01-01 DIAGNOSIS — M70.61 GREATER TROCHANTERIC BURSITIS OF RIGHT HIP: ICD-10-CM

## 2022-01-01 DIAGNOSIS — C26.9 MALIGNANT NEOPLASM OF GASTROINTESTINAL TRACT: ICD-10-CM

## 2022-01-01 DIAGNOSIS — M72.2 PLANTAR FASCIITIS OF RIGHT FOOT: ICD-10-CM

## 2022-01-01 DIAGNOSIS — F32.5 MAJOR DEPRESSIVE DISORDER WITH SINGLE EPISODE, IN FULL REMISSION: Primary | ICD-10-CM

## 2022-01-01 DIAGNOSIS — R74.8 ELEVATED LIVER ENZYMES: ICD-10-CM

## 2022-01-01 DIAGNOSIS — E53.8 B12 DEFICIENCY: ICD-10-CM

## 2022-01-01 DIAGNOSIS — F41.9 ANXIETY: Primary | ICD-10-CM

## 2022-01-01 DIAGNOSIS — E66.01 CLASS 2 SEVERE OBESITY DUE TO EXCESS CALORIES WITH SERIOUS COMORBIDITY AND BODY MASS INDEX (BMI) OF 35.0 TO 35.9 IN ADULT: ICD-10-CM

## 2022-01-01 DIAGNOSIS — M77.11 LATERAL EPICONDYLITIS OF RIGHT ELBOW: Primary | ICD-10-CM

## 2022-01-01 DIAGNOSIS — M54.31 SCIATICA OF RIGHT SIDE: ICD-10-CM

## 2022-01-01 DIAGNOSIS — F41.1 GAD (GENERALIZED ANXIETY DISORDER): ICD-10-CM

## 2022-01-01 DIAGNOSIS — R53.83 OTHER FATIGUE: ICD-10-CM

## 2022-01-01 DIAGNOSIS — F32.2 CURRENT SEVERE EPISODE OF MAJOR DEPRESSIVE DISORDER WITHOUT PSYCHOTIC FEATURES WITHOUT PRIOR EPISODE: Primary | ICD-10-CM

## 2022-01-01 DIAGNOSIS — F41.1 GAD (GENERALIZED ANXIETY DISORDER): Primary | ICD-10-CM

## 2022-01-01 LAB
25(OH)D3+25(OH)D2 SERPL-MCNC: 20.2 NG/ML (ref 30–100)
ALBUMIN SERPL-MCNC: 4.6 G/DL (ref 3.5–5.2)
ALBUMIN/GLOB SERPL: 2.2 G/DL
ALP SERPL-CCNC: 55 U/L (ref 39–117)
ALT SERPL-CCNC: 42 U/L (ref 1–33)
AST SERPL-CCNC: 36 U/L (ref 1–32)
BASOPHILS # BLD AUTO: ABNORMAL 10*3/UL
BASOPHILS # BLD MANUAL: 0.04 10*3/MM3 (ref 0–0.2)
BASOPHILS NFR BLD MANUAL: 1.1 % (ref 0–1.5)
BILIRUB SERPL-MCNC: 0.5 MG/DL (ref 0–1.2)
BUN SERPL-MCNC: 13 MG/DL (ref 6–20)
BUN/CREAT SERPL: 15.5 (ref 7–25)
CALCIUM SERPL-MCNC: 10 MG/DL (ref 8.6–10.5)
CHLORIDE SERPL-SCNC: 100 MMOL/L (ref 98–107)
CHOLEST SERPL-MCNC: 218 MG/DL (ref 0–200)
CO2 SERPL-SCNC: 27.8 MMOL/L (ref 22–29)
CREAT SERPL-MCNC: 0.84 MG/DL (ref 0.57–1)
DIFFERENTIAL COMMENT: ABNORMAL
EGFRCR SERPLBLD CKD-EPI 2021: 84.8 ML/MIN/1.73
EOSINOPHIL # BLD AUTO: ABNORMAL 10*3/UL
EOSINOPHIL NFR BLD AUTO: ABNORMAL %
ERYTHROCYTE [DISTWIDTH] IN BLOOD BY AUTOMATED COUNT: 16.2 % (ref 12.3–15.4)
GLOBULIN SER CALC-MCNC: 2.1 GM/DL
GLUCOSE SERPL-MCNC: 98 MG/DL (ref 65–99)
HCT VFR BLD AUTO: 38.3 % (ref 34–46.6)
HDLC SERPL-MCNC: 49 MG/DL (ref 40–60)
HGB BLD-MCNC: 13 G/DL (ref 12–15.9)
LDLC SERPL CALC-MCNC: 134 MG/DL (ref 0–100)
LYMPHOCYTES # BLD AUTO: ABNORMAL 10*3/UL
LYMPHOCYTES # BLD MANUAL: 1.13 10*3/MM3 (ref 0.7–3.1)
LYMPHOCYTES NFR BLD AUTO: ABNORMAL %
LYMPHOCYTES NFR BLD MANUAL: 31.6 % (ref 19.6–45.3)
MCH RBC QN AUTO: 38 PG (ref 26.6–33)
MCHC RBC AUTO-ENTMCNC: 33.9 G/DL (ref 31.5–35.7)
MCV RBC AUTO: 112 FL (ref 79–97)
MONOCYTES # BLD MANUAL: 0.53 10*3/MM3 (ref 0.1–0.9)
MONOCYTES NFR BLD AUTO: ABNORMAL %
MONOCYTES NFR BLD MANUAL: 14.7 % (ref 5–12)
NEUTROPHILS # BLD MANUAL: 1.89 10*3/MM3 (ref 1.7–7)
NEUTROPHILS NFR BLD AUTO: ABNORMAL %
NEUTROPHILS NFR BLD MANUAL: 52.6 % (ref 42.7–76)
PLATELET # BLD AUTO: 260 10*3/MM3 (ref 140–450)
PLATELET BLD QL SMEAR: ABNORMAL
POTASSIUM SERPL-SCNC: 4.7 MMOL/L (ref 3.5–5.2)
PROT SERPL-MCNC: 6.7 G/DL (ref 6–8.5)
RBC # BLD AUTO: 3.42 10*6/MM3 (ref 3.77–5.28)
RBC MORPH BLD: ABNORMAL
SODIUM SERPL-SCNC: 141 MMOL/L (ref 136–145)
TRIGL SERPL-MCNC: 196 MG/DL (ref 0–150)
TSH SERPL DL<=0.005 MIU/L-ACNC: 5.83 UIU/ML (ref 0.27–4.2)
VIT B12 SERPL-MCNC: 324 PG/ML (ref 211–946)
VLDLC SERPL CALC-MCNC: 35 MG/DL (ref 5–40)
WBC # BLD AUTO: 3.59 10*3/MM3 (ref 3.4–10.8)

## 2022-01-01 PROCEDURE — 20550 NJX 1 TENDON SHEATH/LIGAMENT: CPT | Performed by: FAMILY MEDICINE

## 2022-01-01 PROCEDURE — 99213 OFFICE O/P EST LOW 20 MIN: CPT | Performed by: FAMILY MEDICINE

## 2022-01-01 PROCEDURE — 99214 OFFICE O/P EST MOD 30 MIN: CPT | Performed by: FAMILY MEDICINE

## 2022-01-01 PROCEDURE — 20610 DRAIN/INJ JOINT/BURSA W/O US: CPT | Performed by: FAMILY MEDICINE

## 2022-01-01 PROCEDURE — 96372 THER/PROPH/DIAG INJ SC/IM: CPT | Performed by: FAMILY MEDICINE

## 2022-01-01 PROCEDURE — 90686 IIV4 VACC NO PRSV 0.5 ML IM: CPT | Performed by: FAMILY MEDICINE

## 2022-01-01 PROCEDURE — 90471 IMMUNIZATION ADMIN: CPT | Performed by: FAMILY MEDICINE

## 2022-01-01 RX ORDER — LAMOTRIGINE 150 MG/1
150 TABLET ORAL DAILY
COMMUNITY

## 2022-01-01 RX ORDER — TRIAMCINOLONE ACETONIDE 40 MG/ML
40 INJECTION, SUSPENSION INTRA-ARTICULAR; INTRAMUSCULAR ONCE
Status: COMPLETED | OUTPATIENT
Start: 2022-01-01 | End: 2022-01-01

## 2022-01-01 RX ORDER — SULFAMETHOXAZOLE AND TRIMETHOPRIM 800; 160 MG/1; MG/1
1 TABLET ORAL 2 TIMES DAILY
Qty: 20 TABLET | Refills: 0 | Status: SHIPPED | OUTPATIENT
Start: 2022-01-01 | End: 2022-01-01

## 2022-01-01 RX ORDER — LIDOCAINE HYDROCHLORIDE 20 MG/ML
0.5 INJECTION, SOLUTION INFILTRATION; PERINEURAL ONCE
Status: COMPLETED | OUTPATIENT
Start: 2022-01-01 | End: 2022-01-01

## 2022-01-01 RX ORDER — DIPHENOXYLATE HYDROCHLORIDE AND ATROPINE SULFATE 2.5; .025 MG/1; MG/1
TABLET ORAL
COMMUNITY
Start: 2022-01-01

## 2022-01-01 RX ORDER — FLUOXETINE HYDROCHLORIDE 40 MG/1
40 CAPSULE ORAL DAILY
Qty: 90 CAPSULE | Refills: 1 | Status: SHIPPED | OUTPATIENT
Start: 2022-01-01 | End: 2022-01-01

## 2022-01-01 RX ORDER — LAMOTRIGINE 100 MG/1
100 TABLET ORAL DAILY
Qty: 30 TABLET | Refills: 5 | Status: SHIPPED | OUTPATIENT
Start: 2022-01-01 | End: 2022-01-01 | Stop reason: DRUGHIGH

## 2022-01-01 RX ORDER — LIDOCAINE HYDROCHLORIDE 20 MG/ML
1 INJECTION, SOLUTION INFILTRATION; PERINEURAL ONCE
Status: COMPLETED | OUTPATIENT
Start: 2022-01-01 | End: 2022-01-01

## 2022-01-01 RX ORDER — TRIAMCINOLONE ACETONIDE 40 MG/ML
20 INJECTION, SUSPENSION INTRA-ARTICULAR; INTRAMUSCULAR ONCE
Status: COMPLETED | OUTPATIENT
Start: 2022-01-01 | End: 2022-01-01

## 2022-01-01 RX ORDER — TIRZEPATIDE 5 MG/.5ML
5 INJECTION, SOLUTION SUBCUTANEOUS WEEKLY
Qty: 2 ML | Refills: 5 | Status: SHIPPED | OUTPATIENT
Start: 2022-01-01 | End: 2022-01-01 | Stop reason: SDUPTHER

## 2022-01-01 RX ORDER — BUSPIRONE HYDROCHLORIDE 15 MG/1
TABLET ORAL
Qty: 60 TABLET | Refills: 1 | Status: SHIPPED | OUTPATIENT
Start: 2022-01-01

## 2022-01-01 RX ORDER — FLUOXETINE HYDROCHLORIDE 20 MG/1
CAPSULE ORAL
Qty: 30 CAPSULE | Refills: 11 | Status: SHIPPED | OUTPATIENT
Start: 2022-01-01 | End: 2022-01-01

## 2022-01-01 RX ORDER — SYRINGE W-NEEDLE,DISPOSAB,3 ML 25GX5/8"
SYRINGE, EMPTY DISPOSABLE MISCELLANEOUS
Qty: 3 EACH | Refills: 1 | Status: SHIPPED | OUTPATIENT
Start: 2022-01-01

## 2022-01-01 RX ORDER — TIRZEPATIDE 5 MG/.5ML
5 INJECTION, SOLUTION SUBCUTANEOUS WEEKLY
Qty: 6 ML | Refills: 3 | Status: SHIPPED | OUTPATIENT
Start: 2022-01-01 | End: 2023-01-01 | Stop reason: SDUPTHER

## 2022-01-01 RX ORDER — BUPROPION HYDROCHLORIDE 150 MG/1
150 TABLET ORAL DAILY
COMMUNITY

## 2022-01-01 RX ORDER — TIRZEPATIDE 2.5 MG/.5ML
2.5 INJECTION, SOLUTION SUBCUTANEOUS WEEKLY
Qty: 2 ML | Refills: 0 | Status: SHIPPED | OUTPATIENT
Start: 2022-01-01 | End: 2023-01-01 | Stop reason: SDUPTHER

## 2022-01-01 RX ORDER — SUNITINIB MALATE 37.5 MG/1
37.5 CAPSULE ORAL DAILY
COMMUNITY

## 2022-01-01 RX ADMIN — TRIAMCINOLONE ACETONIDE 40 MG: 40 INJECTION, SUSPENSION INTRA-ARTICULAR; INTRAMUSCULAR at 11:02

## 2022-01-01 RX ADMIN — TRIAMCINOLONE ACETONIDE 40 MG: 40 INJECTION, SUSPENSION INTRA-ARTICULAR; INTRAMUSCULAR at 10:30

## 2022-01-01 RX ADMIN — LIDOCAINE HYDROCHLORIDE 1 ML: 20 INJECTION, SOLUTION INFILTRATION; PERINEURAL at 13:59

## 2022-01-01 RX ADMIN — LIDOCAINE HYDROCHLORIDE 1 ML: 20 INJECTION, SOLUTION INFILTRATION; PERINEURAL at 10:29

## 2022-01-01 RX ADMIN — TRIAMCINOLONE ACETONIDE 40 MG: 40 INJECTION, SUSPENSION INTRA-ARTICULAR; INTRAMUSCULAR at 13:59

## 2022-01-01 RX ADMIN — LIDOCAINE HYDROCHLORIDE 0.5 ML: 20 INJECTION, SOLUTION INFILTRATION; PERINEURAL at 10:29

## 2022-01-01 RX ADMIN — TRIAMCINOLONE ACETONIDE 20 MG: 40 INJECTION, SUSPENSION INTRA-ARTICULAR; INTRAMUSCULAR at 10:30

## 2022-02-08 PROBLEM — C78.6 MALIGNANT NEOPLASM METASTATIC TO RETROPERITONEUM (HCC): Status: ACTIVE | Noted: 2022-01-01

## 2022-02-08 PROBLEM — C78.7 MALIGNANT NEOPLASM METASTATIC TO LIVER (HCC): Status: ACTIVE | Noted: 2022-01-01

## 2022-02-08 NOTE — PROGRESS NOTES
Subjective   Juanita Burden is a 51 y.o. female. Presents today for   Chief Complaint   Patient presents with   • Anxiety       Anxiety  Presents for follow-up visit. Symptoms include depressed mood, excessive worry and nervous/anxious behavior. Symptoms occur occasionally. The severity of symptoms is mild. The quality of sleep is fair. Nighttime awakenings: occasional.     Compliance with medications is %. Treatment side effects:  struggling and not getting help.     Since last seen had abscess that has cleared, +mrsa    Review of Systems   Psychiatric/Behavioral: The patient is nervous/anxious.        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 60 tablet 5   • FLUoxetine (PROzac) 20 MG capsule Take 1 20mg cap daily with 40mg capsule of fluoxetine 30 capsule 5   • FLUoxetine (PROzac) 40 MG capsule TAKE 1 CAPSULE BY MOUTH EVERY DAY 90 capsule 1   • folic acid (FOLVITE) 1 MG  "tablet Take 1,000 mcg by mouth Daily.  5   • loratadine (CLARITIN) 10 MG tablet Take 10 mg by mouth Daily.     • norethindrone (AYGESTIN) 5 MG tablet Take 5 mg by mouth Daily.  11   • pantoprazole (PROTONIX) 40 MG EC tablet Take 40 mg by mouth 2 (Two) Times a Day.       No current facility-administered medications on file prior to visit.       Objective   Vitals:    02/08/22 0942   BP: 108/68   Pulse: 87   SpO2: 97%   Weight: 86.2 kg (190 lb)   Height: 157.5 cm (62\")     Body mass index is 34.75 kg/m².    Physical Exam  Vitals and nursing note reviewed.   Constitutional:       Appearance: Normal appearance. She is not toxic-appearing or diaphoretic.   HENT:      Head: Normocephalic and atraumatic.   Musculoskeletal:      Cervical back: Neck supple.   Skin:     General: Skin is warm and dry.      Capillary Refill: Capillary refill takes less than 2 seconds.   Neurological:      Mental Status: She is alert.   Psychiatric:         Mood and Affect: Mood normal.         Behavior: Behavior normal.       Examination: CT Abdomen Pelvis W     Date of examination:  01/03/2022 at 1:41 PM     Accession number: [id number]     Indication: Restaging metastatic small bowel GIST.     Comparison: Outside imaging studies including CT imaging of the chest and abdominopelvic cavity performed at [facility] dated 07/16/2021 and 04/21/2021. CT imaging studies of the abdominopelvic cavity performed at [facility] dated 12/04/2020, 06/02/2020 and 11/22/2019. PET/CT imaging done at [facility] dated 12/21/2020. PET/CT imaging done at [facility] dated 11/12/2018.     Technique: Helical CT imaging was performed through the abdominopelvic cavity following intravenous administration of 100 cc of Isovue-370 with images recorded as axial 5 mm slices.  Sagittal 5 mm and coronal 4 mm reformats are included.     Findings:  Cardiac size remains non-enlarged and the lung bases remain clear. Small hiatal hernia. "     On today's exam, there is visibility of approximately six tiny and small hypodense lesions identified in the liver (the largest in segment 4 measuring approximately 1 x 1 x 1 cm. An the most recent comparison study from 07/16/2021, there were approximately fifteen visible hypodense liver lesions with the aforementioned segment 4 lesion estimated at 1.25 x 1.25  x 1 cm on that exam.     A small quantity of pneumobilia is associated with the left hepatic lobe. Since the prior exam, the gallbladder has been removed (see operative report and pathology results from [facility] dated 09/16/2021). Various post-surgical changes are seen across the anterior abdominal wall. Incidental accessory splenule. Spleen is non-enlarged. Pancreas, adrenal glands, and kidneys are normal.     Post-surgical changes are seen in the left hemiabdomen involving the jejunum and the colon related to previous surgical resection of jejunal small bowel GIST malignancy (see operative report and pathology results from [facility] dated 01/06/2021). No reemergence of the various peritoneal metastases as were seen on older imaging studies and most obvious on the PET/CT from 11/12/2018.     No new or enlarging abdominopelvic or ilioinguinal adenopathy. A small quantity of low density material is seen in the uterine lumen. No adnexal mass. Minimal free fluid is present in the pelvis. Bladder normal with visibility of bilateral ureteral jets. No suspicious skeletal lesions. Mild spinal curvature with scattered degenerative changes affecting the spine. Enthesopathy is associated with the greater trochanter of each hip.     Impression: Improved exam compared to 07/16/2021.     1. Treatment responsive hepatic metastatic disease. The size and multiplicity of hepatic hepatic metastases shows at least moderate reduction compared to prior imaging done 6 months ago on 07/16/2021.     2. Post-surgical changes in the left hemiabdomen  involving the small and large bowel related to prior surgery for malignant small bowel jejunal GIST with colonic involvement (see operative report and pathology results from [facility] dated 01/06/2021)     3. No evidence of metastatic abdominopelvic lymphadenopathy nor evidence of re-emerging peritoneal metastatic disease.      Assessment/Plan   Diagnoses and all orders for this visit:    1. FILEMON (generalized anxiety disorder) (Primary)    2. Malignant neoplasm of gastrointestinal tract (HCC)    reports following joe plan for diet  Has GIST tumor - last scan better;  george starks  Filemon - stable on meds, will continue;  With  but he is not doing well and adds stress.         -Follow up: 6 months and prn

## 2022-08-09 PROBLEM — M79.10 MUSCLE PAIN: Status: ACTIVE | Noted: 2022-01-01

## 2022-08-09 PROBLEM — R07.9 CHEST PAIN: Status: ACTIVE | Noted: 2022-01-01

## 2022-08-09 PROBLEM — I35.1 AORTIC VALVE REGURGITATION: Status: ACTIVE | Noted: 2022-01-01

## 2022-08-09 NOTE — PROGRESS NOTES
Subjective   Juanita Burden is a 51 y.o. female. Presents today for   Chief Complaint   Patient presents with   • Depression   • Hip Pain       History of Present Illness  Patient here for wellness exam;  Has GIST tumor ongoing chemo by oncology;  Reports stable;  Had melt down at oncology and set-up psychiatry, on wellbutrin now along with lamictal.  Doing better.  Has right lateral hip pain;  Hurts to touch and move;  Also plantar fasciitis on right heel nothing helps;      Sees gyn for mammo and will call to arrange, last one 2 years ago this month.    Review of Systems   Musculoskeletal: Positive for arthralgias. Negative for joint swelling.       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     • busPIRone (BUSPAR) 15 MG tablet TAKE ONE TABLET BY MOUTH TWICE A DAY 60 tablet 1   • lamoTRIgine (LaMICtal) 100 MG tablet Take  "1 tablet by mouth Daily. 30 tablet 5   • loratadine (CLARITIN) 10 MG tablet Take 10 mg by mouth Daily.     • norethindrone (AYGESTIN) 5 MG tablet Take 5 mg by mouth Daily.  11   • pantoprazole (PROTONIX) 40 MG EC tablet Take 40 mg by mouth 2 (Two) Times a Day.     • [DISCONTINUED] FLUoxetine (PROzac) 20 MG capsule TAKE ONE CAPSULE BY MOUTH DAILY WITH 40MG FLUOXETINE CAPSULE 30 capsule 11   • [DISCONTINUED] FLUoxetine (PROzac) 40 MG capsule Take 1 capsule by mouth Daily. 90 capsule 1   • [DISCONTINUED] folic acid (FOLVITE) 1 MG tablet Take 1,000 mcg by mouth Daily.  5     No current facility-administered medications on file prior to visit.       Objective   Vitals:    08/09/22 0933   BP: 110/84   Pulse: 92   SpO2: 98%   Weight: 88.9 kg (196 lb)   Height: 157.5 cm (62\")     Body mass index is 35.85 kg/m².    Physical Exam  Vitals and nursing note reviewed.   Constitutional:       Appearance: Normal appearance. She is not toxic-appearing or diaphoretic.   HENT:      Head: Normocephalic and atraumatic.   Musculoskeletal:      Cervical back: Neck supple.      Right lower leg: Tenderness present.      Right foot: Tenderness (anterior to heel) present.        Legs:    Skin:     General: Skin is warm and dry.      Capillary Refill: Capillary refill takes less than 2 seconds.   Neurological:      Mental Status: She is alert.   Psychiatric:         Mood and Affect: Mood normal.         Behavior: Behavior normal.     Arthrocentesis    Date/Time: 8/9/2022 10:20 AM  Performed by: Chao Carroll DO  Authorized by: Chao Carroll DO   Consent: Verbal consent obtained.  Risks and benefits: risks, benefits and alternatives were discussed  Consent given by: patient  Patient understanding: patient states understanding of the procedure being performed  Site marked: the operative site was marked  Required items: required blood products, implants, devices, and special equipment available  Patient identity confirmed: verbally with " patient  Indications: pain   Body area: hip  Joint: right hip  Local anesthesia used: yes    Anesthesia:  Local anesthesia used: yes  Local Anesthetic: topical anesthetic    Sedation:  Patient sedated: no    Preparation: Patient was prepped and draped in the usual sterile fashion.  Needle size: 22 G  Approach: lateral  Patient tolerance: Patient tolerated the procedure well with no immediate complications    - Injection Tendon or Ligament - right plantar fascia    Date/Time: 8/9/2022 10:21 AM  Performed by: Chao Carroll DO  Authorized by: Chao Carroll DO   Consent: Verbal consent obtained.  Risks and benefits: risks, benefits and alternatives were discussed  Consent given by: patient  Patient understanding: patient states understanding of the procedure being performed  Site marked: the operative site was marked  Required items: required blood products, implants, devices, and special equipment available  Patient identity confirmed: verbally with patient  Preparation: Patient was prepped and draped in the usual sterile fashion.  Local anesthesia used: yes    Anesthesia:  Local anesthesia used: yes  Local Anesthetic: topical anesthetic    Sedation:  Patient sedated: no    Patient tolerance: Patient tolerated the procedure well with no immediate complications            Assessment & Plan   Diagnoses and all orders for this visit:    1. Major depressive disorder with single episode, in full remission (HCC) (Primary)    2. Greater trochanteric bursitis of right hip  -     lidocaine (XYLOCAINE) 2% injection 1 mL  -     triamcinolone acetonide (KENALOG-40) injection 40 mg  -     Arthrocentesis    3. Plantar fasciitis of right foot  -     triamcinolone acetonide (KENALOG-40) injection 20 mg  -     lidocaine (XYLOCAINE) 2% injection 0.5 mL  -     - Injection Tendon or Ligament        contineu wellbutrin and lamictal;  contineu therapy  Post-joint injection instructions given, warned may be sore and achy next 2-3  days, recommend ice as directed.         -Follow up: 3 months and prn

## 2022-10-21 PROBLEM — Z92.21 HISTORY OF CHEMOTHERAPY: Status: ACTIVE | Noted: 2022-01-01

## 2022-10-21 NOTE — TELEPHONE ENCOUNTER
----- Message from Noreen Cohen MA sent at 10/18/2022 12:10 PM EDT -----  Regarding: FW: Tennis Elbow Pain  Contact: [phone]    ----- Message -----  From: Juanita Burden  Sent: 10/18/2022  11:00 AM EDT  To: [facility]  Subject: Tennis Elbow Pain                                It's been a long time since I've had problems with this tennis elbow. I'm not sure what flared it up but it is extremely painful. Very hard gripping things and if I'm able to, I drop it. Do you think a steroid shot would help and if so is this something I need an appt for or can it be handled like a vaccine  ? I have an apt with you on Nov 14th but it can't wait that long. What do you suggest  ?    Thank you,  Juanita Burden

## 2022-11-15 NOTE — PROGRESS NOTES
Subjective   Juanita Burden is a 51 y.o. female. Presents today for   Chief Complaint   Patient presents with   • Anxiety   • Depression       History of Present Illness  Patient here for f/u of florin/depression;  Had GIST tumor on long-term chemo suppressing;  Doing well mood wise;   No cp/soa    Right hip pain still, would like steroid injection;      Has fatigue and low energy;      Review of Systems   Respiratory: Negative for shortness of breath.    Cardiovascular: Negative for chest pain and palpitations.   Gastrointestinal: Negative for abdominal pain.       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     • busPIRone (BUSPAR) 15 MG tablet TAKE ONE TABLET BY MOUTH TWICE A DAY 60 tablet 1   • diphenoxylate-atropine (LOMOTIL) 2.5-0.025 MG per tablet   See Instructions, Take 1-2 tablets po q 3-4 h prn  "diarrhea, # 120 Each, 0 Refill(s), [facility]: [facility], cm, 04/13/22 14:39:00 EDT, CLINICALHEIGHT, 89.15, kg, 04/13/22 14:39:00 EDT, CLINICALWEIGHT, 157.48     • lamoTRIgine (LaMICtal) 150 MG tablet Take 1 tablet by mouth Daily.     • loratadine (CLARITIN) 10 MG tablet Take 10 mg by mouth Daily.     • pantoprazole (PROTONIX) 40 MG EC tablet Take 40 mg by mouth 2 (Two) Times a Day.     • SUNItinib (SUTENT) 37.5 MG capsule chemo capsule Take 1 capsule by mouth Daily.     • [DISCONTINUED] norethindrone (AYGESTIN) 5 MG tablet Take 5 mg by mouth Daily.  11     No current facility-administered medications on file prior to visit.       Objective   Vitals:    11/15/22 0956   BP: 112/82   Pulse: 89   SpO2: 99%   Weight: 88 kg (194 lb)   Height: 157.5 cm (62\")     Body mass index is 35.48 kg/m².    Physical Exam  Vitals and nursing note reviewed.   Constitutional:       Appearance: She is well-developed.   HENT:      Head: Normocephalic and atraumatic.   Neck:      Thyroid: No thyromegaly.      Vascular: No JVD.   Cardiovascular:      Rate and Rhythm: Normal rate and regular rhythm.      Heart sounds: Normal heart sounds. No murmur heard.    No friction rub. No gallop.   Pulmonary:      Effort: Pulmonary effort is normal. No respiratory distress.      Breath sounds: Normal breath sounds. No wheezing or rales.   Abdominal:      General: Bowel sounds are normal. There is no distension.      Palpations: Abdomen is soft.      Tenderness: There is no abdominal tenderness. There is no guarding or rebound.   Musculoskeletal:      Cervical back: Neck supple.   Skin:     General: Skin is warm and dry.   Neurological:      Mental Status: She is alert.   Psychiatric:         Behavior: Behavior normal.         Assessment & Plan   Diagnoses and all orders for this visit:    1. Major depressive disorder with single episode, in full remission (HCC) (Primary)    2. FILEMON (generalized anxiety disorder)    3. Flu vaccine need  -     " FluLaval/Fluarix/Fluzone >6 Months    4. Other fatigue  -     CBC & Differential  -     Comprehensive Metabolic Panel  -     TSH  -     Vitamin B12    5. Class 2 severe obesity due to excess calories with serious comorbidity and body mass index (BMI) of 35.0 to 35.9 in adult (HCC)  -     Tirzepatide (Mounjaro) 2.5 MG/0.5ML solution pen-injector; Inject 2.5 mg under the skin into the appropriate area as directed 1 (One) Time Per Week. X 4 weeks then go to 5mg  Dispense: 2 mL; Refill: 0  -     Discontinue: Tirzepatide (Mounjaro) 5 MG/0.5ML solution pen-injector; Inject 0.5 mL under the skin into the appropriate area as directed 1 (One) Time Per Week.  Dispense: 2 mL; Refill: 5  -     Tirzepatide (Mounjaro) 5 MG/0.5ML solution pen-injector; Inject 0.5 mL under the skin into the appropriate area as directed 1 (One) Time Per Week.  Dispense: 6 mL; Refill: 3    6. Dyslipidemia  -     Lipid Panel    7. Vitamin D deficiency  -     Vitamin D,25-Hydroxy    up date flu  continue buspar, wellbutrin and lamictal as stable in remission  Will try mounjaro for weigh tloss ast candidate  Check labs fatigue;  Due check vitamin D  Due check lipids         -Follow up: 3 months and prn

## 2022-11-21 NOTE — PROGRESS NOTES
Call results to patient.  B12 is low normal and size of red blood cells are high.  This can suggest b12 deficiency.  Since won't hurt to just give, I ordered b12 injections and sent to pharmacy.  If doesn't have someone can give, set-up nurse visits here.  Vitamin D is low - I sent D3 5000 iu po daily to start  Thyroid borderline off, return for full thyroid panel  Liver enzymes up a little, but really in margin of normal.  However, since history of malignancy rather be safe and image/check more labs.  Stop in for labs and I ordered US to look at liver.    Cholesterol up a little, work on diet, exercise and weight loss. with patient

## 2023-01-01 ENCOUNTER — DOCUMENTATION (OUTPATIENT)
Dept: FAMILY MEDICINE CLINIC | Facility: CLINIC | Age: 53
End: 2023-01-01
Payer: COMMERCIAL

## 2023-01-01 ENCOUNTER — TELEPHONE (OUTPATIENT)
Dept: FAMILY MEDICINE CLINIC | Facility: CLINIC | Age: 53
End: 2023-01-01

## 2023-01-01 DIAGNOSIS — E66.01 CLASS 2 SEVERE OBESITY DUE TO EXCESS CALORIES WITH SERIOUS COMORBIDITY AND BODY MASS INDEX (BMI) OF 35.0 TO 35.9 IN ADULT: ICD-10-CM

## 2023-01-01 RX ORDER — TIRZEPATIDE 5 MG/.5ML
5 INJECTION, SOLUTION SUBCUTANEOUS WEEKLY
Qty: 6 ML | Refills: 3 | Status: SHIPPED | OUTPATIENT
Start: 2023-01-01 | End: 2023-01-01 | Stop reason: SDUPTHER

## 2023-01-01 RX ORDER — TIRZEPATIDE 5 MG/.5ML
5 INJECTION, SOLUTION SUBCUTANEOUS WEEKLY
Qty: 6 ML | Refills: 3 | Status: SHIPPED | OUTPATIENT
Start: 2023-01-01

## 2023-01-01 RX ORDER — TIRZEPATIDE 2.5 MG/.5ML
2.5 INJECTION, SOLUTION SUBCUTANEOUS WEEKLY
Qty: 2 ML | Refills: 0 | Status: SHIPPED | OUTPATIENT
Start: 2023-01-01 | End: 2023-01-01 | Stop reason: SDUPTHER

## 2023-01-01 RX ORDER — TIRZEPATIDE 2.5 MG/.5ML
2.5 INJECTION, SOLUTION SUBCUTANEOUS WEEKLY
Qty: 2 ML | Refills: 0 | Status: SHIPPED | OUTPATIENT
Start: 2023-01-01

## 2023-01-01 RX ORDER — TIRZEPATIDE 2.5 MG/.5ML
2.5 INJECTION, SOLUTION SUBCUTANEOUS WEEKLY
Qty: 2 ML | Refills: 0 | Status: SHIPPED | OUTPATIENT
Start: 2023-01-01 | End: 2023-01-01

## 2023-02-06 NOTE — TELEPHONE ENCOUNTER
Caller: YOLANDA HARMON    Relationship: Friend    Best call back number: [phone]    What is the best time to reach you: ANYTIME     Who are you requesting to speak with (clinical staff, provider,  specific staff member): DR. MORRISON OR CASSIE     What was the call regarding: PATIENT STATES SHE WAS CALLING TO INFORM DR. NOVOA THAT THE PATIENT PASSED AWAY THIS MORNING 2/6/23.     Do you require a callback: YES